# Patient Record
Sex: FEMALE | Race: WHITE | Employment: PART TIME | URBAN - METROPOLITAN AREA
[De-identification: names, ages, dates, MRNs, and addresses within clinical notes are randomized per-mention and may not be internally consistent; named-entity substitution may affect disease eponyms.]

---

## 2017-01-10 ENCOUNTER — HOSPITAL ENCOUNTER (OUTPATIENT)
Dept: MAMMOGRAPHY | Age: 47
Discharge: HOME | End: 2017-01-10
Attending: FAMILY MEDICINE
Payer: COMMERCIAL

## 2017-01-10 DIAGNOSIS — Z12.31 ENCOUNTER FOR SCREENING MAMMOGRAM FOR BREAST CANCER: ICD-10-CM

## 2017-01-10 PROCEDURE — 77063 BREAST TOMOSYNTHESIS BI: CPT

## 2017-01-10 PROCEDURE — 77067 SCR MAMMO BI INCL CAD: CPT

## 2017-03-06 RX ORDER — ALCLOMETASONE DIPROPIONATE 0.5 MG/G
CREAM TOPICAL
Qty: 45 G | Refills: 1 | Status: CANCELLED | OUTPATIENT
Start: 2017-03-06

## 2017-03-06 NOTE — TELEPHONE ENCOUNTER
LOV 11/7/16. Future Appointments  Date Time Provider Naseem Vale   4/5/2017 10:30 AM Darinel López MD [facility] OB/GYNE [facility]     Refill requests for Zolpidem and Alprazolam- meds pended.  Routed to [facility] [place] [facility]

## 2017-03-06 NOTE — PROGRESS NOTES
Faxed request from [facility] for refill on Escitalopram. LOV 11/7/16.   Will you authorize refill- med pending

## 2017-03-08 RX ORDER — ESCITALOPRAM OXALATE 10 MG/1
TABLET ORAL
Qty: 90 TABLET | Refills: 0 | Status: SHIPPED | COMMUNITY
Start: 2017-03-08 | End: 2017-03-08

## 2017-03-08 RX ORDER — ALPRAZOLAM 0.5 MG/1
0.5 TABLET ORAL NIGHTLY PRN
Qty: 30 TABLET | Refills: 2 | OUTPATIENT
Start: 2017-03-08 | End: 2017-07-18

## 2017-03-08 RX ORDER — ESCITALOPRAM OXALATE 10 MG/1
TABLET ORAL
Qty: 90 TABLET | Refills: 0 | Status: SHIPPED | OUTPATIENT
Start: 2017-03-08 | End: 2017-09-12

## 2017-03-08 RX ORDER — ZOLPIDEM TARTRATE 10 MG/1
10 TABLET ORAL NIGHTLY
Qty: 30 TABLET | Refills: 2 | OUTPATIENT
Start: 2017-03-08 | End: 2017-11-13 | Stop reason: DRUGHIGH

## 2017-03-08 NOTE — TELEPHONE ENCOUNTER
Called to [facility] pharmacy per Jamar Lal PA-C order Alprazolam 0.5mg #30 with 2 additional refills and Zolpidem 10mg #30 with 2 additional refills. Spoke with Sangeeta Balbuena telling her she is due for an appointment in 3 months. She is currently driving.  When sh

## 2017-03-08 NOTE — PROGRESS NOTES
HPI:    Patient ID: Isabella Brandon is a 49year old female.     HPI    Review of Systems         Current Outpatient Prescriptions:  Alclometasone Dipropionate 0.05 % External Cream Apply to eyelid and around the nose once daily Disp: 45 g Rfl: 1   [facility] & Referrals:  None       #[id number]

## 2017-03-08 NOTE — PROGRESS NOTES
Escitalopram 10mg #90 refilled per Karina Lal's order to [facility]. Patient is aware she is due for a f/u visit in 3 months.       For your review  Routed to Karina Lal PA-C

## 2017-03-09 ENCOUNTER — TELEPHONE (OUTPATIENT)
Dept: FAMILY MEDICINE CLINIC | Facility: CLINIC | Age: 47
End: 2017-03-09

## 2017-03-09 NOTE — TELEPHONE ENCOUNTER
Spoke with pharmacy- they states they received out refill request but Pt not due for refill till 3/23/17, script last filled 2/23/17 #30.   Pt called back and realized that she has enough meds till next refill

## 2017-03-09 NOTE — TELEPHONE ENCOUNTER
Patient would like to speak to nurse regarding Zolpidem Tartrate 10 MG Oral Tab patient states she did not receive medication from pharmacy. Patient only received alprazolam medication.

## 2017-03-09 NOTE — TELEPHONE ENCOUNTER
Patient called back, patient received both medications from [facility].

## 2017-07-09 ENCOUNTER — LAB SERVICES (OUTPATIENT)
Dept: OTHER | Age: 47
End: 2017-07-09

## 2017-07-09 ENCOUNTER — CHARTING TRANS (OUTPATIENT)
Dept: OTHER | Age: 47
End: 2017-07-09

## 2017-07-09 LAB — RAPID STREP GROUP A: POSITIVE

## 2017-09-12 ENCOUNTER — HOSPITAL ENCOUNTER (EMERGENCY)
Age: 47
Discharge: HOME | End: 2017-09-12
Attending: EMERGENCY MEDICINE
Payer: COMMERCIAL

## 2017-09-12 VITALS
HEIGHT: 69 IN | HEART RATE: 88 BPM | BODY MASS INDEX: 24.14 KG/M2 | TEMPERATURE: 99 F | SYSTOLIC BLOOD PRESSURE: 140 MMHG | DIASTOLIC BLOOD PRESSURE: 86 MMHG | OXYGEN SATURATION: 100 % | RESPIRATION RATE: 16 BRPM | WEIGHT: 163 LBS

## 2017-09-12 DIAGNOSIS — N30.01 ACUTE CYSTITIS WITH HEMATURIA: Primary | ICD-10-CM

## 2017-09-12 LAB
POCT LOT NUMBER: NORMAL
POCT URINE PREGNANCY: NEGATIVE
RBC #/AREA URNS AUTO: >10 /HPF
RBC #/AREA URNS AUTO: >10 /HPF
WBC #/AREA URNS AUTO: >50 /HPF
WBC #/AREA URNS AUTO: >50 /HPF
WBC CLUMPS UR QL AUTO: PRESENT
WBC CLUMPS UR QL AUTO: PRESENT

## 2017-09-12 PROCEDURE — 87186 SC STD MICRODIL/AGAR DIL: CPT | Performed by: EMERGENCY MEDICINE

## 2017-09-12 PROCEDURE — 99284 EMERGENCY DEPT VISIT MOD MDM: CPT

## 2017-09-12 PROCEDURE — 87086 URINE CULTURE/COLONY COUNT: CPT | Performed by: EMERGENCY MEDICINE

## 2017-09-12 PROCEDURE — 81025 URINE PREGNANCY TEST: CPT

## 2017-09-12 PROCEDURE — 87491 CHLMYD TRACH DNA AMP PROBE: CPT | Performed by: EMERGENCY MEDICINE

## 2017-09-12 PROCEDURE — 87591 N.GONORRHOEAE DNA AMP PROB: CPT | Performed by: EMERGENCY MEDICINE

## 2017-09-12 PROCEDURE — 87088 URINE BACTERIA CULTURE: CPT | Performed by: EMERGENCY MEDICINE

## 2017-09-12 PROCEDURE — 87660 TRICHOMONAS VAGIN DIR PROBE: CPT | Performed by: EMERGENCY MEDICINE

## 2017-09-12 PROCEDURE — 81015 MICROSCOPIC EXAM OF URINE: CPT | Performed by: EMERGENCY MEDICINE

## 2017-09-12 PROCEDURE — 87510 GARDNER VAG DNA DIR PROBE: CPT | Performed by: EMERGENCY MEDICINE

## 2017-09-12 PROCEDURE — 87480 CANDIDA DNA DIR PROBE: CPT | Performed by: EMERGENCY MEDICINE

## 2017-09-12 PROCEDURE — 81001 URINALYSIS AUTO W/SCOPE: CPT | Performed by: EMERGENCY MEDICINE

## 2017-09-12 RX ORDER — PHENAZOPYRIDINE HYDROCHLORIDE 200 MG/1
200 TABLET, FILM COATED ORAL 3 TIMES DAILY PRN
Qty: 6 TABLET | Refills: 0 | Status: SHIPPED | OUTPATIENT
Start: 2017-09-12 | End: 2017-09-19

## 2017-09-12 RX ORDER — SULFAMETHOXAZOLE AND TRIMETHOPRIM 800; 160 MG/1; MG/1
1 TABLET ORAL 2 TIMES DAILY
Qty: 14 TABLET | Refills: 0 | Status: SHIPPED | OUTPATIENT
Start: 2017-09-12 | End: 2017-09-19

## 2017-09-12 RX ORDER — PHENAZOPYRIDINE HYDROCHLORIDE 200 MG/1
200 TABLET, FILM COATED ORAL ONCE
Status: COMPLETED | OUTPATIENT
Start: 2017-09-12 | End: 2017-09-12

## 2017-09-12 NOTE — ED PROVIDER NOTES
Patient Seen in: [facility] In [place]    History   Patient presents with:  Urinary Symptoms (urologic)    Stated Complaint: urinary symptoms x few days    HPI    Patient presents with a possible urinary tract infection.   The patient distress. HEENT: Normocephalic, atraumatic, pupils equal round and reactive to light, oropharynx clear, uvula midline. Cardiovascular: Regular rate and rhythm, no murmurs. Respiratory: Lungs clear to auscultation.   Abdomen: Soft, minimal suprapubic tend diagnosis)    Disposition:  Discharge    Follow-up:  Emery Martins MD  [address] [address] [place] [address] [phone]    Schedule an appointment as soon as possible for a visit in 1 week        Medications Prescribed:  Current Discharge Medi

## 2017-09-13 RX ORDER — METRONIDAZOLE 500 MG/1
500 TABLET ORAL 2 TIMES DAILY
Qty: 14 TABLET | Refills: 0 | Status: SHIPPED | OUTPATIENT
Start: 2017-09-13 | End: 2017-09-20

## 2017-09-13 NOTE — ED NOTES
Pt informed of vaginitis panel results, ptverbalizes understanding , flagyl called to [facility] in [address]

## 2017-09-14 LAB
C TRACH DNA SPEC QL NAA+PROBE: NEGATIVE
N GONORRHOEA DNA SPEC QL NAA+PROBE: NEGATIVE

## 2017-09-25 ENCOUNTER — LAB ENCOUNTER (OUTPATIENT)
Dept: LAB | Age: 47
End: 2017-09-25
Attending: OBSTETRICS & GYNECOLOGY
Payer: COMMERCIAL

## 2017-09-25 DIAGNOSIS — N30.00 ACUTE CYSTITIS WITHOUT HEMATURIA: ICD-10-CM

## 2017-09-25 PROCEDURE — 87077 CULTURE AEROBIC IDENTIFY: CPT

## 2017-09-25 PROCEDURE — 87088 URINE BACTERIA CULTURE: CPT

## 2017-09-25 PROCEDURE — 87086 URINE CULTURE/COLONY COUNT: CPT

## 2017-09-25 PROCEDURE — 87186 SC STD MICRODIL/AGAR DIL: CPT

## 2017-10-16 ENCOUNTER — LAB ENCOUNTER (OUTPATIENT)
Dept: LAB | Age: 47
End: 2017-10-16
Attending: PLASTIC SURGERY
Payer: COMMERCIAL

## 2017-10-16 DIAGNOSIS — Z87.440 HISTORY OF URINARY TRACT INFECTION: ICD-10-CM

## 2017-10-16 PROCEDURE — 87086 URINE CULTURE/COLONY COUNT: CPT

## 2017-10-28 ENCOUNTER — OFFICE VISIT (OUTPATIENT)
Dept: FAMILY MEDICINE CLINIC | Facility: CLINIC | Age: 47
End: 2017-10-28

## 2017-10-28 VITALS
HEIGHT: 69 IN | SYSTOLIC BLOOD PRESSURE: 144 MMHG | TEMPERATURE: 99 F | BODY MASS INDEX: 23.99 KG/M2 | DIASTOLIC BLOOD PRESSURE: 70 MMHG | OXYGEN SATURATION: 97 % | HEART RATE: 87 BPM | WEIGHT: 162 LBS

## 2017-10-28 DIAGNOSIS — R30.0 DYSURIA: Primary | ICD-10-CM

## 2017-10-28 PROCEDURE — 81003 URINALYSIS AUTO W/O SCOPE: CPT | Performed by: NURSE PRACTITIONER

## 2017-10-28 PROCEDURE — 99213 OFFICE O/P EST LOW 20 MIN: CPT | Performed by: NURSE PRACTITIONER

## 2017-10-28 PROCEDURE — 87086 URINE CULTURE/COLONY COUNT: CPT | Performed by: NURSE PRACTITIONER

## 2017-10-28 RX ORDER — CIPROFLOXACIN 500 MG/1
500 TABLET, FILM COATED ORAL 2 TIMES DAILY
Qty: 14 TABLET | Refills: 0 | Status: SHIPPED | OUTPATIENT
Start: 2017-10-28 | End: 2017-11-04

## 2017-11-13 ENCOUNTER — OFFICE VISIT (OUTPATIENT)
Dept: FAMILY MEDICINE CLINIC | Facility: CLINIC | Age: 47
End: 2017-11-13

## 2017-11-13 VITALS
WEIGHT: 156 LBS | DIASTOLIC BLOOD PRESSURE: 80 MMHG | BODY MASS INDEX: 23.11 KG/M2 | HEART RATE: 80 BPM | SYSTOLIC BLOOD PRESSURE: 136 MMHG | HEIGHT: 69 IN | RESPIRATION RATE: 12 BRPM

## 2017-11-13 DIAGNOSIS — Z13.1 SCREENING FOR DIABETES MELLITUS: ICD-10-CM

## 2017-11-13 DIAGNOSIS — Z00.00 BLOOD TESTS FOR ROUTINE GENERAL PHYSICAL EXAMINATION: ICD-10-CM

## 2017-11-13 DIAGNOSIS — Z13.220 SCREENING, LIPID: ICD-10-CM

## 2017-11-13 DIAGNOSIS — Z12.31 ENCOUNTER FOR SCREENING MAMMOGRAM FOR BREAST CANCER: ICD-10-CM

## 2017-11-13 DIAGNOSIS — Z13.29 SCREENING FOR THYROID DISORDER: ICD-10-CM

## 2017-11-13 DIAGNOSIS — Z13.0 SCREENING, ANEMIA, DEFICIENCY, IRON: ICD-10-CM

## 2017-11-13 DIAGNOSIS — Z01.419 WELL WOMAN EXAM: Primary | ICD-10-CM

## 2017-11-13 PROCEDURE — 99396 PREV VISIT EST AGE 40-64: CPT | Performed by: FAMILY MEDICINE

## 2017-11-13 RX ORDER — ALPRAZOLAM 0.5 MG/1
0.5 TABLET ORAL NIGHTLY PRN
Qty: 30 TABLET | Refills: 2 | Status: SHIPPED | OUTPATIENT
Start: 2017-11-13 | End: 2018-03-01

## 2017-11-13 RX ORDER — ZOLPIDEM TARTRATE 5 MG/1
5 TABLET ORAL NIGHTLY PRN
Qty: 30 TABLET | Refills: 5 | Status: SHIPPED | OUTPATIENT
Start: 2017-11-13 | End: 2018-05-05

## 2017-11-13 NOTE — PROGRESS NOTES
:HPI:   Guss Kawasaki is a 49year old female who presents for a complete physical exam.     Patient went off Lexapro 3-4 months ago. She is feeling good. She felt that the medication numbed her. She feels that she is laughing more.     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 : . 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 Exercise: three times per week. Walking, light jogging, weights. Diet: watches calories closely. Eating a 1,000 mg calorie diet.      REVIEW OF SYSTEMS:   GENERAL: feels wel Date/Time   WBC 6.7 12/07/2016 09:15 AM   HGB 14.5 12/07/2016 09:15 AM   HCT 41.8 12/07/2016 09:15 AM   .0 12/07/2016 09:15 AM         CMP  (most recent labs)     Lab Results  Component Value Date/Time   GLU 83 12/07/2016 09:15 AM   BUN 11 12/07/201 Tartrate 5 MG Oral Tab; Take 1 tablet (5 mg total) by mouth nightly as needed for Sleep. Recheck Ambien in 6 months. Labs ordered. Mammogram ordered. Self breast exam explained. Discussed exercise and diet.  The patient indicates understanding o

## 2017-12-01 ENCOUNTER — LAB ENCOUNTER (OUTPATIENT)
Dept: LAB | Age: 47
End: 2017-12-01
Attending: FAMILY MEDICINE
Payer: COMMERCIAL

## 2017-12-01 DIAGNOSIS — Z13.1 SCREENING FOR DIABETES MELLITUS: ICD-10-CM

## 2017-12-01 DIAGNOSIS — Z13.0 SCREENING, ANEMIA, DEFICIENCY, IRON: ICD-10-CM

## 2017-12-01 DIAGNOSIS — Z13.29 SCREENING FOR THYROID DISORDER: ICD-10-CM

## 2017-12-01 DIAGNOSIS — Z13.220 SCREENING, LIPID: ICD-10-CM

## 2017-12-01 DIAGNOSIS — Z00.00 BLOOD TESTS FOR ROUTINE GENERAL PHYSICAL EXAMINATION: ICD-10-CM

## 2017-12-01 PROCEDURE — 85025 COMPLETE CBC W/AUTO DIFF WBC: CPT

## 2017-12-01 PROCEDURE — 80061 LIPID PANEL: CPT

## 2017-12-01 PROCEDURE — 36415 COLL VENOUS BLD VENIPUNCTURE: CPT

## 2017-12-01 PROCEDURE — 80053 COMPREHEN METABOLIC PANEL: CPT

## 2017-12-01 PROCEDURE — 84443 ASSAY THYROID STIM HORMONE: CPT

## 2018-02-06 ENCOUNTER — HOSPITAL ENCOUNTER (OUTPATIENT)
Dept: MAMMOGRAPHY | Age: 48
Discharge: HOME | End: 2018-02-06
Attending: FAMILY MEDICINE
Payer: COMMERCIAL

## 2018-02-06 DIAGNOSIS — Z12.31 ENCOUNTER FOR SCREENING MAMMOGRAM FOR BREAST CANCER: ICD-10-CM

## 2018-02-06 PROCEDURE — 77067 SCR MAMMO BI INCL CAD: CPT | Performed by: FAMILY MEDICINE

## 2018-02-06 PROCEDURE — 77063 BREAST TOMOSYNTHESIS BI: CPT | Performed by: FAMILY MEDICINE

## 2018-02-15 ENCOUNTER — HOSPITAL ENCOUNTER (OUTPATIENT)
Dept: MAMMOGRAPHY | Facility: HOSPITAL | Age: 48
Discharge: HOME | End: 2018-02-15
Attending: FAMILY MEDICINE
Payer: COMMERCIAL

## 2018-02-15 DIAGNOSIS — R92.2 INCONCLUSIVE MAMMOGRAM: ICD-10-CM

## 2018-02-15 PROCEDURE — 77065 DX MAMMO INCL CAD UNI: CPT | Performed by: FAMILY MEDICINE

## 2018-03-02 RX ORDER — ALPRAZOLAM 0.5 MG/1
TABLET ORAL
Qty: 30 TABLET | Refills: 2 | Status: SHIPPED | OUTPATIENT
Start: 2018-03-02 | End: 2018-05-16

## 2018-04-12 ENCOUNTER — TELEPHONE (OUTPATIENT)
Dept: FAMILY MEDICINE CLINIC | Facility: CLINIC | Age: 48
End: 2018-04-12

## 2018-04-12 NOTE — TELEPHONE ENCOUNTER
Pt would like to have her B/C through [facility], she has her taking them differently then usual and it would be easier if she was the one to prescribe them.

## 2018-04-12 NOTE — TELEPHONE ENCOUNTER
LOV 11/13/17 for physical  Pt used to get BCP from gyne. Pt wants to know if PRINCESS Lal will fill script.  Pt states she takes 2 months continuously then has a cycle   Please advise- order pended

## 2018-04-13 RX ORDER — NORETHINDRONE ACETATE AND ETHINYL ESTRADIOL 1MG-20(21)
1 KIT ORAL DAILY
Qty: 3 PACKAGE | Refills: 3 | Status: SHIPPED | OUTPATIENT
Start: 2018-04-13 | End: 2018-09-17

## 2018-05-07 RX ORDER — ZOLPIDEM TARTRATE 5 MG/1
TABLET ORAL
Qty: 30 TABLET | Refills: 0 | OUTPATIENT
Start: 2018-05-07 | End: 2018-05-16

## 2018-05-07 NOTE — TELEPHONE ENCOUNTER
Amarilis pt has made appt for next Monday to see you for Ambien refill. She is completely out. Per your request she has now made appt. Please let clinical staff know when patient can  script. Thank you.

## 2018-05-07 NOTE — TELEPHONE ENCOUNTER
Please call Pt and assist with scheduling a appt per Madelyn Lal's instruction. LOV 11/13/17. Once appt is made, please forward message to Madelyn Lal to sign script. Routed to [facility].

## 2018-05-07 NOTE — TELEPHONE ENCOUNTER
Refill request sent from pharmacy for Ambien. LOV 11/13/17. Will you approve refill ? Routed to [employer]

## 2018-05-16 ENCOUNTER — OFFICE VISIT (OUTPATIENT)
Dept: FAMILY MEDICINE CLINIC | Facility: CLINIC | Age: 48
End: 2018-05-16

## 2018-05-16 VITALS
HEIGHT: 69 IN | DIASTOLIC BLOOD PRESSURE: 88 MMHG | BODY MASS INDEX: 22.66 KG/M2 | TEMPERATURE: 98 F | SYSTOLIC BLOOD PRESSURE: 128 MMHG | WEIGHT: 153 LBS | HEART RATE: 60 BPM | RESPIRATION RATE: 16 BRPM

## 2018-05-16 DIAGNOSIS — G47.09 OTHER INSOMNIA: ICD-10-CM

## 2018-05-16 DIAGNOSIS — N94.3 PREMENSTRUAL TENSION: ICD-10-CM

## 2018-05-16 DIAGNOSIS — R92.8 FOLLOW-UP EXAMINATION OF ABNORMAL MAMMOGRAM: Primary | ICD-10-CM

## 2018-05-16 PROCEDURE — 99214 OFFICE O/P EST MOD 30 MIN: CPT | Performed by: FAMILY MEDICINE

## 2018-05-16 RX ORDER — ALPRAZOLAM 0.5 MG/1
TABLET ORAL
Qty: 30 TABLET | Refills: 5 | Status: SHIPPED | OUTPATIENT
Start: 2018-05-16 | End: 2018-09-17

## 2018-05-16 RX ORDER — ZOLPIDEM TARTRATE 5 MG/1
TABLET ORAL
Qty: 30 TABLET | Refills: 5 | Status: SHIPPED | OUTPATIENT
Start: 2018-05-16 | End: 2018-09-17

## 2018-05-16 NOTE — PROGRESS NOTES
Brandi Bhatia is a 51year old female. S:  Patient presents today with the following concerns:  · Follow up Ambien and alprazolam.  She uses each separately to help with sleep. 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   She feels she headaches    EXAM:  /88   Pulse 60   Temp 98.3 °F (36.8 °C) (Oral)   Resp 16   Ht 69\"   Wt 153 lb   BMI 22.59 kg/m²   GENERAL: well developed, well nourished,in no apparent distress.   Mood, affect, and behavior are normal.  SKIN: no rashes,no suspic

## 2018-08-15 ENCOUNTER — TELEPHONE (OUTPATIENT)
Dept: FAMILY MEDICINE CLINIC | Facility: CLINIC | Age: 48
End: 2018-08-15

## 2018-08-15 ENCOUNTER — HOSPITAL ENCOUNTER (OUTPATIENT)
Dept: MAMMOGRAPHY | Facility: HOSPITAL | Age: 48
Discharge: HOME | End: 2018-08-15
Attending: FAMILY MEDICINE
Payer: COMMERCIAL

## 2018-08-15 DIAGNOSIS — R92.8 FOLLOW-UP EXAMINATION OF ABNORMAL MAMMOGRAM: ICD-10-CM

## 2018-08-15 DIAGNOSIS — R92.8 ABNORMALITY OF RIGHT BREAST ON SCREENING MAMMOGRAM: Primary | ICD-10-CM

## 2018-08-15 PROCEDURE — 77065 DX MAMMO INCL CAD UNI: CPT | Performed by: FAMILY MEDICINE

## 2018-08-15 PROCEDURE — 77061 BREAST TOMOSYNTHESIS UNI: CPT | Performed by: FAMILY MEDICINE

## 2018-08-15 NOTE — IMAGING NOTE
Called pt regarding recommendation by Dr. Gabriela Wagner for right breast stereotactic biopsy for calcifications. Pt states she will call back at better time to thoroughly go over instructions. Written information provided to Jj Kim via [address]  Pt verb

## 2018-08-15 NOTE — TELEPHONE ENCOUNTER
Ria Landa from [facility] requesting to speak to a nurse regarding Abnormal Mammogram results

## 2018-08-15 NOTE — TELEPHONE ENCOUNTER
Talking to Dr Yared Headley patient needs stereotatic biopsy of single area in right breast order placed as requested

## 2018-08-20 ENCOUNTER — TELEPHONE (OUTPATIENT)
Dept: MAMMOGRAPHY | Facility: HOSPITAL | Age: 48
End: 2018-08-20

## 2018-08-20 NOTE — TELEPHONE ENCOUNTER
Pt returned phone call back for recommendation for a right breast biopsy for calcifications. Emotional and educational support provided. Written information provided to Tiara Peers.  Our breast center schedulers will call pt within 72 hours to schedule

## 2018-09-10 ENCOUNTER — HOSPITAL ENCOUNTER (OUTPATIENT)
Dept: MAMMOGRAPHY | Facility: HOSPITAL | Age: 48
Discharge: HOME | End: 2018-09-10
Attending: FAMILY MEDICINE
Payer: COMMERCIAL

## 2018-09-10 DIAGNOSIS — R92.8 ABNORMALITY OF RIGHT BREAST ON SCREENING MAMMOGRAM: ICD-10-CM

## 2018-09-10 PROCEDURE — 88305 TISSUE EXAM BY PATHOLOGIST: CPT | Performed by: FAMILY MEDICINE

## 2018-09-10 PROCEDURE — 19081 BX BREAST 1ST LESION STRTCTC: CPT | Performed by: FAMILY MEDICINE

## 2018-09-12 ENCOUNTER — TELEPHONE (OUTPATIENT)
Dept: FAMILY MEDICINE CLINIC | Facility: CLINIC | Age: 48
End: 2018-09-12

## 2018-09-12 ENCOUNTER — TELEPHONE (OUTPATIENT)
Dept: MAMMOGRAPHY | Facility: HOSPITAL | Age: 48
End: 2018-09-12

## 2018-09-12 NOTE — TELEPHONE ENCOUNTER
[facility] a breast biopsy result and she would like to speak to a nurse.

## 2018-09-12 NOTE — TELEPHONE ENCOUNTER
LM with Nurse in office regarding patient's new breast diagnosis of FEA. Surgical consult is recommended. all MD's and Tiffany Young PA-C are out of the office until tomorrow. Awaiting call back with name of surgeon to refer pt to.

## 2018-09-12 NOTE — TELEPHONE ENCOUNTER
Spoke with Elizabeth, Breast care nurse and she states that pt's biopsy reveals atypical cells right breast,FEA splat epithelial atypica. This requires surgery. She is requesting the name of a surgeon. Pleae advise so that we can call her back.      Flor

## 2018-09-13 ENCOUNTER — TELEPHONE (OUTPATIENT)
Dept: MAMMOGRAPHY | Facility: HOSPITAL | Age: 48
End: 2018-09-13

## 2018-09-13 NOTE — TELEPHONE ENCOUNTER
Spoke with pt about new Right breast diagnosis of FEA. Pt thankful for information and is anxious to get consult and surgery. Said she would like to call surgeon to arrange consult. Information about Dr. Marti Ingram given to her. Pt verbalized understanding.

## 2018-09-17 ENCOUNTER — HOSPITAL ENCOUNTER (OUTPATIENT)
Dept: GENERAL RADIOLOGY | Age: 48
Discharge: HOME | End: 2018-09-17
Attending: FAMILY MEDICINE
Payer: COMMERCIAL

## 2018-09-17 ENCOUNTER — OFFICE VISIT (OUTPATIENT)
Dept: FAMILY MEDICINE CLINIC | Facility: CLINIC | Age: 48
End: 2018-09-17
Payer: COMMERCIAL

## 2018-09-17 VITALS
RESPIRATION RATE: 16 BRPM | WEIGHT: 154.19 LBS | BODY MASS INDEX: 22.58 KG/M2 | DIASTOLIC BLOOD PRESSURE: 86 MMHG | HEART RATE: 76 BPM | SYSTOLIC BLOOD PRESSURE: 128 MMHG | HEIGHT: 69.25 IN | TEMPERATURE: 99 F

## 2018-09-17 DIAGNOSIS — Z13.29 SCREENING FOR THYROID DISORDER: ICD-10-CM

## 2018-09-17 DIAGNOSIS — Z00.00 LABORATORY EXAMINATION ORDERED AS PART OF A ROUTINE GENERAL MEDICAL EXAMINATION: ICD-10-CM

## 2018-09-17 DIAGNOSIS — R76.8 ANA POSITIVE: ICD-10-CM

## 2018-09-17 DIAGNOSIS — N39.0 URINARY TRACT INFECTION WITHOUT HEMATURIA, SITE UNSPECIFIED: Primary | ICD-10-CM

## 2018-09-17 DIAGNOSIS — Z13.0 SCREENING FOR IRON DEFICIENCY ANEMIA: ICD-10-CM

## 2018-09-17 DIAGNOSIS — M89.9 LUMP OF RIB: ICD-10-CM

## 2018-09-17 LAB
MULTISTIX LOT#: ABNORMAL NUMERIC
NITRITE, URINE: POSITIVE
PH, URINE: 7 (ref 4.5–8)
SPECIFIC GRAVITY: 1.02 (ref 1–1.03)

## 2018-09-17 PROCEDURE — 81003 URINALYSIS AUTO W/O SCOPE: CPT | Performed by: FAMILY MEDICINE

## 2018-09-17 PROCEDURE — 87186 SC STD MICRODIL/AGAR DIL: CPT | Performed by: FAMILY MEDICINE

## 2018-09-17 PROCEDURE — 99214 OFFICE O/P EST MOD 30 MIN: CPT | Performed by: FAMILY MEDICINE

## 2018-09-17 PROCEDURE — 87086 URINE CULTURE/COLONY COUNT: CPT | Performed by: FAMILY MEDICINE

## 2018-09-17 PROCEDURE — 87088 URINE BACTERIA CULTURE: CPT | Performed by: FAMILY MEDICINE

## 2018-09-17 PROCEDURE — 71101 X-RAY EXAM UNILAT RIBS/CHEST: CPT | Performed by: FAMILY MEDICINE

## 2018-09-17 RX ORDER — NORETHINDRONE ACETATE AND ETHINYL ESTRADIOL 1MG-20(21)
1 KIT ORAL DAILY
Qty: 3 PACKAGE | Refills: 3 | Status: SHIPPED | OUTPATIENT
Start: 2018-09-17 | End: 2019-09-19

## 2018-09-17 RX ORDER — ZOLPIDEM TARTRATE 5 MG/1
TABLET ORAL
Qty: 30 TABLET | Refills: 5 | Status: SHIPPED | OUTPATIENT
Start: 2018-09-17 | End: 2019-04-13

## 2018-09-17 RX ORDER — ALPRAZOLAM 0.5 MG/1
TABLET ORAL
Qty: 30 TABLET | Refills: 5 | Status: SHIPPED | OUTPATIENT
Start: 2018-09-17 | End: 2018-09-18 | Stop reason: ALTCHOICE

## 2018-09-17 RX ORDER — IBUPROFEN 200 MG
1 CAPSULE ORAL DAILY
COMMUNITY

## 2018-09-17 RX ORDER — NITROFURANTOIN 25; 75 MG/1; MG/1
100 CAPSULE ORAL 2 TIMES DAILY
Qty: 14 CAPSULE | Refills: 0 | Status: SHIPPED | OUTPATIENT
Start: 2018-09-17 | End: 2018-10-09

## 2018-09-17 RX ORDER — THIAMINE HCL 100 MG
TABLET ORAL NIGHTLY
COMMUNITY

## 2018-09-17 NOTE — PROGRESS NOTES
Tiara Holloway is a 51year old female. S:  Patient presents today with the following concerns:  · Had recent breast biopsy with FEA results. Dr. Mervat Brink tomorrow. · UTI symptoms for 1 month. Cloudy urine. No dysuria. Foul odor to urine.     · Lab exertion  GI: denies abdominal pain.   No N/V/D/C  : denies dysuria  MUSCULOSKELETAL: denies back pain  NEURO: denies headaches    EXAM:  /86   Pulse 76   Temp 99.1 °F (37.3 °C) (Oral)   Resp 16   Ht 69.25\"   Wt 154 lb 3.2 oz   BMI 22.61 kg/m²   GE by mouth 2 (two) times daily. Imaging & Consults:  XR RIBS, UNILATERAL (2 VIEWS), LEFT (CPT=71100)    Meds and orders as above. Possibly fractured rib with moving daughter in. Labs ordered. Urine culture sent.   Encouraged regular physical Carl Guzman

## 2018-09-18 ENCOUNTER — LAB ENCOUNTER (OUTPATIENT)
Dept: LAB | Age: 48
End: 2018-09-18
Attending: FAMILY MEDICINE
Payer: COMMERCIAL

## 2018-09-18 ENCOUNTER — OFFICE VISIT (OUTPATIENT)
Dept: SURGERY | Facility: CLINIC | Age: 48
End: 2018-09-18
Payer: COMMERCIAL

## 2018-09-18 VITALS
RESPIRATION RATE: 20 BRPM | BODY MASS INDEX: 22.58 KG/M2 | HEART RATE: 73 BPM | WEIGHT: 154.19 LBS | HEIGHT: 69.25 IN | SYSTOLIC BLOOD PRESSURE: 148 MMHG | DIASTOLIC BLOOD PRESSURE: 89 MMHG | OXYGEN SATURATION: 100 %

## 2018-09-18 DIAGNOSIS — R76.8 ANA POSITIVE: ICD-10-CM

## 2018-09-18 DIAGNOSIS — Z13.0 SCREENING FOR IRON DEFICIENCY ANEMIA: ICD-10-CM

## 2018-09-18 DIAGNOSIS — Z00.00 LABORATORY EXAMINATION ORDERED AS PART OF A ROUTINE GENERAL MEDICAL EXAMINATION: ICD-10-CM

## 2018-09-18 DIAGNOSIS — Z13.29 SCREENING FOR THYROID DISORDER: ICD-10-CM

## 2018-09-18 DIAGNOSIS — N60.81 FLAT EPITHELIAL ATYPIA (FEA) OF RIGHT BREAST: Primary | ICD-10-CM

## 2018-09-18 LAB
ALBUMIN SERPL-MCNC: 3.6 G/DL (ref 3.5–4.8)
ALBUMIN/GLOB SERPL: 1 {RATIO} (ref 1–2)
ALP LIVER SERPL-CCNC: 48 U/L (ref 39–100)
ALT SERPL-CCNC: 25 U/L (ref 14–54)
ANION GAP SERPL CALC-SCNC: 12 MMOL/L (ref 0–18)
AST SERPL-CCNC: 33 U/L (ref 15–41)
BASOPHILS # BLD AUTO: 0.08 X10(3) UL (ref 0–0.1)
BASOPHILS NFR BLD AUTO: 1.5 %
BILIRUB SERPL-MCNC: 0.4 MG/DL (ref 0.1–2)
BUN BLD-MCNC: 9 MG/DL (ref 8–20)
BUN/CREAT SERPL: 12.3 (ref 10–20)
CALCIUM BLD-MCNC: 8.7 MG/DL (ref 8.3–10.3)
CHLORIDE SERPL-SCNC: 108 MMOL/L (ref 101–111)
CHOLEST SMN-MCNC: 193 MG/DL (ref ?–200)
CO2 SERPL-SCNC: 19 MMOL/L (ref 22–32)
CREAT BLD-MCNC: 0.73 MG/DL (ref 0.55–1.02)
DEPRECATED HBV CORE AB SER IA-ACNC: 113.1 NG/ML (ref 12–240)
EOSINOPHIL # BLD AUTO: 0.25 X10(3) UL (ref 0–0.3)
EOSINOPHIL NFR BLD AUTO: 4.8 %
ERYTHROCYTE [DISTWIDTH] IN BLOOD BY AUTOMATED COUNT: 11.9 % (ref 11.5–16)
GLOBULIN PLAS-MCNC: 3.5 G/DL (ref 2.5–4)
GLUCOSE BLD-MCNC: 67 MG/DL (ref 70–99)
HCT VFR BLD AUTO: 41.1 % (ref 34–50)
HDLC SERPL-MCNC: 108 MG/DL (ref 40–59)
HGB BLD-MCNC: 14 G/DL (ref 12–16)
IMMATURE GRANULOCYTE COUNT: 0.02 X10(3) UL (ref 0–1)
IMMATURE GRANULOCYTE RATIO %: 0.4 %
LDLC SERPL CALC-MCNC: 71 MG/DL (ref ?–100)
LYMPHOCYTES # BLD AUTO: 1.59 X10(3) UL (ref 0.9–4)
LYMPHOCYTES NFR BLD AUTO: 30.3 %
M PROTEIN MFR SERPL ELPH: 7.1 G/DL (ref 6.1–8.3)
MCH RBC QN AUTO: 33.4 PG (ref 27–33.2)
MCHC RBC AUTO-ENTMCNC: 34.1 G/DL (ref 31–37)
MCV RBC AUTO: 98.1 FL (ref 81–100)
MONOCYTES # BLD AUTO: 0.4 X10(3) UL (ref 0.1–1)
MONOCYTES NFR BLD AUTO: 7.6 %
NEUTROPHIL ABS PRELIM: 2.91 X10 (3) UL (ref 1.3–6.7)
NEUTROPHILS # BLD AUTO: 2.91 X10(3) UL (ref 1.3–6.7)
NEUTROPHILS NFR BLD AUTO: 55.4 %
NONHDLC SERPL-MCNC: 85 MG/DL (ref ?–130)
OSMOLALITY SERPL CALC.SUM OF ELEC: 285 MOSM/KG (ref 275–295)
PLATELET # BLD AUTO: 313 10(3)UL (ref 150–450)
POTASSIUM SERPL-SCNC: 4.2 MMOL/L (ref 3.6–5.1)
RBC # BLD AUTO: 4.19 X10(6)UL (ref 3.8–5.1)
RED CELL DISTRIBUTION WIDTH-SD: 42.8 FL (ref 35.1–46.3)
SODIUM SERPL-SCNC: 139 MMOL/L (ref 136–144)
TRIGL SERPL-MCNC: 71 MG/DL (ref 30–149)
TSI SER-ACNC: 2.26 MIU/ML (ref 0.35–5.5)
VIT D+METAB SERPL-MCNC: 51.8 NG/ML (ref 30–100)
VLDLC SERPL CALC-MCNC: 14 MG/DL (ref 0–30)
WBC # BLD AUTO: 5.3 X10(3) UL (ref 4–13)

## 2018-09-18 PROCEDURE — 99245 OFF/OP CONSLTJ NEW/EST HI 55: CPT | Performed by: SURGERY

## 2018-09-18 PROCEDURE — 80053 COMPREHEN METABOLIC PANEL: CPT

## 2018-09-18 PROCEDURE — 82306 VITAMIN D 25 HYDROXY: CPT

## 2018-09-18 PROCEDURE — 80061 LIPID PANEL: CPT

## 2018-09-18 PROCEDURE — 84443 ASSAY THYROID STIM HORMONE: CPT

## 2018-09-18 PROCEDURE — 36415 COLL VENOUS BLD VENIPUNCTURE: CPT

## 2018-09-18 PROCEDURE — 86038 ANTINUCLEAR ANTIBODIES: CPT

## 2018-09-18 PROCEDURE — 85025 COMPLETE CBC W/AUTO DIFF WBC: CPT

## 2018-09-18 PROCEDURE — 82728 ASSAY OF FERRITIN: CPT

## 2018-09-18 RX ORDER — MELATONIN 10 MG
1 CAPSULE ORAL
COMMUNITY
End: 2018-10-09

## 2018-09-20 LAB — ANA SCREEN: NEGATIVE

## 2018-09-26 DIAGNOSIS — M89.9 DISORDER OF RIB: Primary | ICD-10-CM

## 2018-10-01 ENCOUNTER — TELEPHONE (OUTPATIENT)
Dept: FAMILY MEDICINE CLINIC | Facility: CLINIC | Age: 48
End: 2018-10-01

## 2018-10-01 DIAGNOSIS — N30.00 ACUTE CYSTITIS WITHOUT HEMATURIA: Primary | ICD-10-CM

## 2018-10-01 NOTE — TELEPHONE ENCOUNTER
Pt would like another urine culture, she spoke to a nurse and Winferd  and if it is not cleared up she would like to have another urine culture. Please add to chart. Thank you.

## 2018-10-03 ENCOUNTER — APPOINTMENT (OUTPATIENT)
Dept: LAB | Facility: HOSPITAL | Age: 48
End: 2018-10-03
Attending: FAMILY MEDICINE
Payer: COMMERCIAL

## 2018-10-03 ENCOUNTER — HOSPITAL ENCOUNTER (OUTPATIENT)
Dept: ULTRASOUND IMAGING | Facility: HOSPITAL | Age: 48
Discharge: HOME | End: 2018-10-03
Attending: FAMILY MEDICINE
Payer: COMMERCIAL

## 2018-10-03 DIAGNOSIS — M89.9 DISORDER OF RIB: ICD-10-CM

## 2018-10-03 DIAGNOSIS — N30.00 ACUTE CYSTITIS WITHOUT HEMATURIA: ICD-10-CM

## 2018-10-03 PROCEDURE — 87186 SC STD MICRODIL/AGAR DIL: CPT

## 2018-10-03 PROCEDURE — 87088 URINE BACTERIA CULTURE: CPT

## 2018-10-03 PROCEDURE — 76604 US EXAM CHEST: CPT | Performed by: FAMILY MEDICINE

## 2018-10-03 PROCEDURE — 87086 URINE CULTURE/COLONY COUNT: CPT

## 2018-10-04 NOTE — PROGRESS NOTES
Breast Surgery New Patient Consultation    This is the first visit for this 51year old woman, referred by Dr. Martínez Funez, who presents for evaluation of right breast FEA.     History of Present Illness:   Ms. Fidel Lechuga is a 51year old woman who presents (100 mg total) by mouth 2 (two) times daily. Disp: 14 capsule Rfl: 0   Vitamin C (VITAMIN C) 500 MG Oral Tab Take 500 mg by mouth daily. Disp:  Rfl:    Multiple Vitamins-Minerals (B COMPLEX VITAMINS PLUS) Oral Tab Take 1 tablet by mouth daily.  Disp:  Rfl: the legs or chest pain while walking.     Breasts:  See history of present illness    Gastrointestinal:     There is no history of difficulty or pain with swallowing, reflux symptoms, vomiting, dark or bloody stools, constipation, yellowing of the skin, ind movements are normal. Her affect is appropriate. HEENT: The head is normocephalic. The neck is supple. The thyroid is not enlarged and is without palpable masses/nodules. There are no palpable masses. The trachea is in the midline.  Conjunctiva are clear significance and implications of FEA found on core biopsy with regard to probability of underdiagnosis of DCIS, and with regard to future breast cancer risk was discussed with the patient.  For the 1st issue, I would recommend wire localization and surgical

## 2018-10-08 DIAGNOSIS — M89.9 RIB LESION: Primary | ICD-10-CM

## 2018-10-08 DIAGNOSIS — N30.00 ACUTE CYSTITIS WITHOUT HEMATURIA: ICD-10-CM

## 2018-10-08 RX ORDER — SULFAMETHOXAZOLE AND TRIMETHOPRIM 800; 160 MG/1; MG/1
1 TABLET ORAL 2 TIMES DAILY
Qty: 14 TABLET | Refills: 0 | Status: SHIPPED | OUTPATIENT
Start: 2018-10-08 | End: 2018-10-09

## 2018-10-12 ENCOUNTER — TELEPHONE (OUTPATIENT)
Dept: MAMMOGRAPHY | Facility: HOSPITAL | Age: 48
End: 2018-10-12

## 2018-10-12 NOTE — TELEPHONE ENCOUNTER
This  Nurse contacted Mrs Malaika Whalen she is aware of needle localization additional questions answered . Mrs Malaika Whalen was told to arrive at 36 this rn instructed for her to arrive at 7am due to loc schedule for  8am  An Mrs Malaika Whalen needs to be prepped by surge

## 2018-10-14 ENCOUNTER — HOSPITAL ENCOUNTER (OUTPATIENT)
Dept: CT IMAGING | Age: 48
Discharge: HOME | End: 2018-10-14
Attending: FAMILY MEDICINE
Payer: COMMERCIAL

## 2018-10-14 ENCOUNTER — HOSPITAL ENCOUNTER (OUTPATIENT)
Dept: CT IMAGING | Age: 48
End: 2018-10-14
Attending: FAMILY MEDICINE
Payer: COMMERCIAL

## 2018-10-14 DIAGNOSIS — M89.9 RIB LESION: ICD-10-CM

## 2018-10-14 PROCEDURE — 71250 CT THORAX DX C-: CPT | Performed by: FAMILY MEDICINE

## 2018-10-15 ENCOUNTER — HOSPITAL ENCOUNTER (OUTPATIENT)
Dept: MAMMOGRAPHY | Facility: HOSPITAL | Age: 48
Discharge: HOME | End: 2018-10-15
Attending: SURGERY
Payer: COMMERCIAL

## 2018-10-15 ENCOUNTER — ANESTHESIA EVENT (OUTPATIENT)
Dept: SURGERY | Facility: HOSPITAL | Age: 48
End: 2018-10-15
Payer: COMMERCIAL

## 2018-10-15 ENCOUNTER — HOSPITAL ENCOUNTER (OUTPATIENT)
Facility: HOSPITAL | Age: 48
Setting detail: HOSPITAL OUTPATIENT SURGERY
Discharge: HOME | End: 2018-10-15
Attending: SURGERY | Admitting: SURGERY
Payer: COMMERCIAL

## 2018-10-15 ENCOUNTER — ANESTHESIA (OUTPATIENT)
Dept: SURGERY | Facility: HOSPITAL | Age: 48
End: 2018-10-15
Payer: COMMERCIAL

## 2018-10-15 ENCOUNTER — APPOINTMENT (OUTPATIENT)
Dept: MAMMOGRAPHY | Facility: HOSPITAL | Age: 48
End: 2018-10-15
Attending: SURGERY
Payer: COMMERCIAL

## 2018-10-15 VITALS
HEIGHT: 69 IN | DIASTOLIC BLOOD PRESSURE: 81 MMHG | TEMPERATURE: 98 F | RESPIRATION RATE: 15 BRPM | SYSTOLIC BLOOD PRESSURE: 123 MMHG | OXYGEN SATURATION: 100 % | BODY MASS INDEX: 22.96 KG/M2 | WEIGHT: 155 LBS | HEART RATE: 84 BPM

## 2018-10-15 VITALS — SYSTOLIC BLOOD PRESSURE: 136 MMHG | DIASTOLIC BLOOD PRESSURE: 84 MMHG | HEART RATE: 79 BPM

## 2018-10-15 DIAGNOSIS — N60.81 FLAT EPITHELIAL ATYPIA (FEA) OF RIGHT BREAST: ICD-10-CM

## 2018-10-15 DIAGNOSIS — N63.10 BREAST MASS, RIGHT: Primary | ICD-10-CM

## 2018-10-15 PROCEDURE — 81025 URINE PREGNANCY TEST: CPT

## 2018-10-15 PROCEDURE — 88307 TISSUE EXAM BY PATHOLOGIST: CPT | Performed by: SURGERY

## 2018-10-15 PROCEDURE — 19281 PERQ DEVICE BREAST 1ST IMAG: CPT | Performed by: SURGERY

## 2018-10-15 PROCEDURE — 0HBT0ZX EXCISION OF RIGHT BREAST, OPEN APPROACH, DIAGNOSTIC: ICD-10-PCS | Performed by: SURGERY

## 2018-10-15 PROCEDURE — 76098 X-RAY EXAM SURGICAL SPECIMEN: CPT | Performed by: SURGERY

## 2018-10-15 RX ORDER — LIDOCAINE HYDROCHLORIDE AND EPINEPHRINE 10; 10 MG/ML; UG/ML
INJECTION, SOLUTION INFILTRATION; PERINEURAL AS NEEDED
Status: DISCONTINUED | OUTPATIENT
Start: 2018-10-15 | End: 2018-10-15 | Stop reason: HOSPADM

## 2018-10-15 RX ORDER — SODIUM CHLORIDE, SODIUM LACTATE, POTASSIUM CHLORIDE, CALCIUM CHLORIDE 600; 310; 30; 20 MG/100ML; MG/100ML; MG/100ML; MG/100ML
INJECTION, SOLUTION INTRAVENOUS CONTINUOUS
Status: DISCONTINUED | OUTPATIENT
Start: 2018-10-15 | End: 2018-10-15

## 2018-10-15 RX ORDER — LIDOCAINE HYDROCHLORIDE 10 MG/ML
1 INJECTION, SOLUTION EPIDURAL; INFILTRATION; INTRACAUDAL; PERINEURAL ONCE
Status: COMPLETED | OUTPATIENT
Start: 2018-10-15 | End: 2018-10-15

## 2018-10-15 RX ORDER — CEFAZOLIN SODIUM/WATER 2 G/20 ML
2 SYRINGE (ML) INTRAVENOUS ONCE
Status: COMPLETED | OUTPATIENT
Start: 2018-10-15 | End: 2018-10-15

## 2018-10-15 RX ORDER — MORPHINE SULFATE 4 MG/ML
4 INJECTION, SOLUTION INTRAMUSCULAR; INTRAVENOUS EVERY 10 MIN PRN
Status: DISCONTINUED | OUTPATIENT
Start: 2018-10-15 | End: 2018-10-15

## 2018-10-15 RX ORDER — HYDROCODONE BITARTRATE AND ACETAMINOPHEN 5; 325 MG/1; MG/1
2 TABLET ORAL AS NEEDED
Status: DISCONTINUED | OUTPATIENT
Start: 2018-10-15 | End: 2018-10-15

## 2018-10-15 RX ORDER — METOCLOPRAMIDE 10 MG/1
10 TABLET ORAL ONCE
Status: DISCONTINUED | OUTPATIENT
Start: 2018-10-15 | End: 2018-10-15 | Stop reason: HOSPADM

## 2018-10-15 RX ORDER — FAMOTIDINE 20 MG/1
20 TABLET ORAL ONCE
Status: DISCONTINUED | OUTPATIENT
Start: 2018-10-15 | End: 2018-10-15 | Stop reason: HOSPADM

## 2018-10-15 RX ORDER — MIDAZOLAM HYDROCHLORIDE 1 MG/ML
INJECTION INTRAMUSCULAR; INTRAVENOUS AS NEEDED
Status: DISCONTINUED | OUTPATIENT
Start: 2018-10-15 | End: 2018-10-15 | Stop reason: SURG

## 2018-10-15 RX ORDER — LIDOCAINE HYDROCHLORIDE 10 MG/ML
INJECTION, SOLUTION EPIDURAL; INFILTRATION; INTRACAUDAL; PERINEURAL AS NEEDED
Status: DISCONTINUED | OUTPATIENT
Start: 2018-10-15 | End: 2018-10-15 | Stop reason: SURG

## 2018-10-15 RX ORDER — HYDROCODONE BITARTRATE AND ACETAMINOPHEN 5; 325 MG/1; MG/1
1 TABLET ORAL AS NEEDED
Status: DISCONTINUED | OUTPATIENT
Start: 2018-10-15 | End: 2018-10-15

## 2018-10-15 RX ORDER — ACETAMINOPHEN 500 MG
1000 TABLET ORAL ONCE
Status: COMPLETED | OUTPATIENT
Start: 2018-10-15 | End: 2018-10-15

## 2018-10-15 RX ORDER — BUPIVACAINE HYDROCHLORIDE 5 MG/ML
INJECTION, SOLUTION EPIDURAL; INTRACAUDAL AS NEEDED
Status: DISCONTINUED | OUTPATIENT
Start: 2018-10-15 | End: 2018-10-15 | Stop reason: HOSPADM

## 2018-10-15 RX ORDER — HALOPERIDOL 5 MG/ML
0.25 INJECTION INTRAMUSCULAR ONCE AS NEEDED
Status: DISCONTINUED | OUTPATIENT
Start: 2018-10-15 | End: 2018-10-15

## 2018-10-15 RX ORDER — NALOXONE HYDROCHLORIDE 0.4 MG/ML
80 INJECTION, SOLUTION INTRAMUSCULAR; INTRAVENOUS; SUBCUTANEOUS AS NEEDED
Status: DISCONTINUED | OUTPATIENT
Start: 2018-10-15 | End: 2018-10-15

## 2018-10-15 RX ORDER — ONDANSETRON 2 MG/ML
4 INJECTION INTRAMUSCULAR; INTRAVENOUS ONCE AS NEEDED
Status: DISCONTINUED | OUTPATIENT
Start: 2018-10-15 | End: 2018-10-15

## 2018-10-15 RX ORDER — MORPHINE SULFATE 4 MG/ML
2 INJECTION, SOLUTION INTRAMUSCULAR; INTRAVENOUS EVERY 10 MIN PRN
Status: DISCONTINUED | OUTPATIENT
Start: 2018-10-15 | End: 2018-10-15

## 2018-10-15 RX ORDER — ONDANSETRON 2 MG/ML
INJECTION INTRAMUSCULAR; INTRAVENOUS AS NEEDED
Status: DISCONTINUED | OUTPATIENT
Start: 2018-10-15 | End: 2018-10-15 | Stop reason: SURG

## 2018-10-15 RX ORDER — MORPHINE SULFATE 10 MG/ML
6 INJECTION, SOLUTION INTRAMUSCULAR; INTRAVENOUS EVERY 10 MIN PRN
Status: DISCONTINUED | OUTPATIENT
Start: 2018-10-15 | End: 2018-10-15

## 2018-10-15 RX ORDER — LIDOCAINE HYDROCHLORIDE 10 MG/ML
INJECTION, SOLUTION EPIDURAL; INFILTRATION; INTRACAUDAL; PERINEURAL
Status: DISCONTINUED
Start: 2018-10-15 | End: 2018-10-15

## 2018-10-15 RX ORDER — HYDROCODONE BITARTRATE AND ACETAMINOPHEN 5; 325 MG/1; MG/1
1-2 TABLET ORAL EVERY 6 HOURS PRN
Qty: 30 TABLET | Refills: 0 | Status: SHIPPED | OUTPATIENT
Start: 2018-10-15 | End: 2018-10-23 | Stop reason: ALTCHOICE

## 2018-10-15 RX ORDER — DEXAMETHASONE SODIUM PHOSPHATE 4 MG/ML
VIAL (ML) INJECTION AS NEEDED
Status: DISCONTINUED | OUTPATIENT
Start: 2018-10-15 | End: 2018-10-15 | Stop reason: SURG

## 2018-10-15 RX ADMIN — CEFAZOLIN SODIUM/WATER 2 G: 2 G/20 ML SYRINGE (ML) INTRAVENOUS at 09:50:00

## 2018-10-15 RX ADMIN — LIDOCAINE HYDROCHLORIDE 50 MG: 10 INJECTION, SOLUTION EPIDURAL; INFILTRATION; INTRACAUDAL; PERINEURAL at 09:44:00

## 2018-10-15 RX ADMIN — DEXAMETHASONE SODIUM PHOSPHATE 4 MG: 4 MG/ML VIAL (ML) INJECTION at 09:50:00

## 2018-10-15 RX ADMIN — LIDOCAINE HYDROCHLORIDE 1 ML: 10 INJECTION, SOLUTION EPIDURAL; INFILTRATION; INTRACAUDAL; PERINEURAL at 08:36:00

## 2018-10-15 RX ADMIN — ONDANSETRON 4 MG: 2 INJECTION INTRAMUSCULAR; INTRAVENOUS at 09:50:00

## 2018-10-15 RX ADMIN — MIDAZOLAM HYDROCHLORIDE 2 MG: 1 INJECTION INTRAMUSCULAR; INTRAVENOUS at 09:44:00

## 2018-10-15 RX ADMIN — SODIUM CHLORIDE, SODIUM LACTATE, POTASSIUM CHLORIDE, CALCIUM CHLORIDE: 600; 310; 30; 20 INJECTION, SOLUTION INTRAVENOUS at 10:29:00

## 2018-10-15 NOTE — IMAGING NOTE
56:  Ms. Chang Cabrera arrived to ultrasound /mammography department      scans completed by mammography technician, Aristeo Veliz taken:  Right Breast atypia    Procedure explained questions answered to Ms. Alford's satisfaction.     Order verified and i

## 2018-10-15 NOTE — ANESTHESIA PROCEDURE NOTES
ANESTHESIA INTUBATION  Date/Time: 10/15/2018 9:44 AM  Urgency: elective    Airway not difficult    General Information and Staff    Patient location during procedure: OR  Anesthesiologist: Celestino Lundborg, MD  Performed: anesthesiologist     Indications

## 2018-10-15 NOTE — PROGRESS NOTES
Discussed CT of the Chest results with Romario Whitten telling her that it was a 10th posterior rib fracture with Callus formation consistent with a healing Fx per [facility].   Told her there is a call in to the radiologist to clarify a comment regarding th

## 2018-10-15 NOTE — OPERATIVE REPORT
[facility]    PATIENT'S NAME: Marla Haddad   ATTENDING PHYSICIAN: Óscar Phelps. Mervat Brink MD   OPERATING PHYSICIAN: Óscar Phelps.  Mervat Brink MD   PATIENT ACCOUNT#:   [id number]    LOCATION:  [facility] [address] PACU [address] [place] [address]  MEDICAL RECORD #:   [id number] compression devices were applied to her legs for DVT prophylaxis. General anesthesia was induced and the right breast was prepped and draped in the usual sterile fashion.   Lidocaine 1% with epinephrine was used to infiltrate the skin and subcutaneous tiss

## 2018-10-15 NOTE — BRIEF OP NOTE
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

## 2018-10-15 NOTE — PROGRESS NOTES
An Addendum was added to the CT of the Chest by Live Alarcon stating: There is a typographical error within the last sentence under the \"CHEST WALL\" heading. The last sentence should read:   \"There are no additional abnormalities along the chest wall identi

## 2018-10-15 NOTE — ANESTHESIA PREPROCEDURE EVALUATION
Anesthesia PreOp Note    HPI:     Isabella Brandon is a 51year old female who presents for preoperative consultation requested by: Aziza Pavon MD    Date of Surgery: 10/15/2018    Procedure(s):  BREAST BIOPSY NEEDLE LOCALIZATION  Indication: Flat ep Tyrone Guo MD Last Rate: 20 mL/hr at 10/15/18 0725   Lidocaine HCl (PF) (XYLOCAINE) 1 % injection SOLN        lactated ringers infusion  Intravenous Continuous Victor M Thomason MD    HYDROcodone-acetaminophen (NORCO) 5-325 MG per tab 1 tablet 1 mg at 10/15/18 0950   dexamethasone Sodium Phosphate (DECADRON) 4 MG/ML injection  Intravenous PRN Jimena Bah MD 4 mg at 10/15/18 0950     Current Outpatient Medications Ordered in Epic:  HYDROcodone-acetaminophen 5-325 MG Oral Tab Take 1-2 tablet Not Asked        Exercise: Yes          3-4 days a week        Bike Helmet: Not Asked        Seat Belt: Yes        Self-Exams: No    Social History Narrative      Not on file      Available pre-op labs reviewed.   Lab Results   Component Value Date    WBC 5 patient desires the anesthetic management as planned.   Ildefonso De La Vega  10/15/2018 10:30 AM

## 2018-10-15 NOTE — H&P
History of Present Illness:   Ms. Bryan Dejesus is a 51year old woman who presents with imaging detected right breast calcifications. The patient denies any palpable masses, nipple discharge, skin changes or axillary symptoms.   She underwent a 3D scree Rfl:    Multiple Vitamins-Minerals (B COMPLEX VITAMINS PLUS) Oral Tab Take 1 tablet by mouth daily. Disp:  Rfl:    Cholecalciferol (VITAMIN D) 1000 UNITS Oral Tab Take 1 tablet by mouth daily.  Disp:  Rfl:          Allergies:       Dander no history of difficulty or pain with swallowing, reflux symptoms, vomiting, dark or bloody stools, constipation, yellowing of the skin, indigestion, nausea, change in bowel habits, diarrhea, abdominal pain or vomiting blood.      Genitourinary:  The patie The thyroid is not enlarged and is without palpable masses/nodules. There are no palpable masses. The trachea is in the midline. Conjunctiva are clear, non-icteric.     Chest: The chest expands symmetrically.  The lungs are clear to auscultation.     Heart: of DCIS, and with regard to future breast cancer risk was discussed with the patient.  For the 1st issue, I would recommend wire localization and surgical excision of the area in order to obtain a referral recommended a right breast wire localized excisiona

## 2018-10-15 NOTE — ANESTHESIA POSTPROCEDURE EVALUATION
Patient: Brandi Bhatia    Procedure Summary     Date:  10/15/18 Room / Location:  [address] [place] [address] / [address] [place] [address] MAIN OR    Anesthesia Start:  1801 Anesthesia Stop:      Procedure:  BREAST BIOPSY NEEDLE LOCALIZATION (Right Breast) Diagnosis:       Flat epithelial

## 2018-10-16 NOTE — PROCEDURES
[facility]  Procedure Note    Patricia Rangel Patient Status:  Outpatient    1970 MRN [id number]   Location [address] [facility] Attending Ethan Lamas MD   Hosp Day # 0 PCP Ariadna Ambriz MD     Procedure:

## 2018-10-17 ENCOUNTER — TELEPHONE (OUTPATIENT)
Dept: SURGERY | Facility: CLINIC | Age: 48
End: 2018-10-17

## 2018-10-17 NOTE — TELEPHONE ENCOUNTER
I contacted the patient to review Dr Campo Resides email regarding her pathology, that all the atypia was removed. I let her know Dr Radha Hebert would review it in detail at her post op appt. Pt appreciated the call, and was thankful.    She is feeling well after

## 2018-10-23 ENCOUNTER — OFFICE VISIT (OUTPATIENT)
Dept: SURGERY | Facility: CLINIC | Age: 48
End: 2018-10-23
Payer: COMMERCIAL

## 2018-10-23 VITALS
HEIGHT: 69 IN | OXYGEN SATURATION: 100 % | HEART RATE: 100 BPM | DIASTOLIC BLOOD PRESSURE: 84 MMHG | BODY MASS INDEX: 22.96 KG/M2 | SYSTOLIC BLOOD PRESSURE: 137 MMHG | RESPIRATION RATE: 18 BRPM | WEIGHT: 155 LBS

## 2018-10-23 DIAGNOSIS — N60.89 FLAT EPITHELIAL ATYPIA (FEA) OF BREAST, UNSPECIFIED LATERALITY: Primary | ICD-10-CM

## 2018-10-23 PROCEDURE — 99024 POSTOP FOLLOW-UP VISIT: CPT | Performed by: SURGERY

## 2018-10-24 NOTE — PROGRESS NOTES
Breast Surgery Post-Operative Visit    Diagnosis: Right breast flat epithelial atypia status post surgical excision on October 8, 2018.     Stage: N/A    Disease Status:  Surgical treatment complete, chemoprevention counseling and high risk surveillance pen achieve pregnancy. Medications:      Biotin 5000 MCG Oral Cap Take 1 tablet by mouth. Disp:  Rfl:    Magnesium 500 MG Oral Tab Take by mouth nightly. Disp:  Rfl:    Calcium Citrate 950 MG Oral Tab Take 1 tablet by mouth daily.  Disp:  Rfl:    Norethin Ac wheezing, difficulty in breathing with exertion, emphysema, chronic bronchitis, shortness of breath or abnormal sound when breathing. Cardiovascular:   There is no history of chest pain, chest pressure/discomfort, palpitations, irregular heartbeat, louise anaphylaxis.     /84 (BP Location: Left arm, Patient Position: Sitting, Cuff Size: adult)   Pulse 100   Resp 18   Ht 1.753 m (5' 9\")   Wt 70.3 kg (155 lb)   SpO2 100%   BMI 22.89 kg/m²     Physical Examination:   Examination shows that the surgical s establish a favorable risk versus benefit profile. We reviewed Everardoleonidas Ada Alford's risk, though she is a candidate tamoxifen for chemoprophylaxis at this time, we will defer given the small volume of atypia and concern of side effects at this time.      The p

## 2018-11-02 ENCOUNTER — APPOINTMENT (OUTPATIENT)
Dept: LAB | Age: 48
End: 2018-11-02
Attending: FAMILY MEDICINE
Payer: COMMERCIAL

## 2018-11-02 DIAGNOSIS — N30.00 ACUTE CYSTITIS WITHOUT HEMATURIA: ICD-10-CM

## 2018-11-02 PROCEDURE — 87086 URINE CULTURE/COLONY COUNT: CPT

## 2018-11-03 VITALS
DIASTOLIC BLOOD PRESSURE: 62 MMHG | BODY MASS INDEX: 24.44 KG/M2 | HEIGHT: 69 IN | SYSTOLIC BLOOD PRESSURE: 112 MMHG | TEMPERATURE: 98.1 F | HEART RATE: 76 BPM | WEIGHT: 165 LBS | RESPIRATION RATE: 12 BRPM

## 2018-11-06 ENCOUNTER — TELEPHONE (OUTPATIENT)
Dept: SURGERY | Facility: CLINIC | Age: 48
End: 2018-11-06

## 2018-11-06 NOTE — TELEPHONE ENCOUNTER
Pt called with questions about how her breast feels after her recent lumpectomy. She feels a little lump by her incision \"where you took the tissue out\", \"just not used to it. \"    She feels her breast is healing well.   Reassurance given that this is n

## 2018-12-11 ENCOUNTER — TELEPHONE (OUTPATIENT)
Dept: FAMILY MEDICINE CLINIC | Facility: CLINIC | Age: 48
End: 2018-12-11

## 2018-12-11 NOTE — TELEPHONE ENCOUNTER
Left message on answering machine to call triage. Pt will need an appt. If she is unable to come into office, we can recommend  [facility].

## 2018-12-21 ENCOUNTER — OFFICE VISIT (OUTPATIENT)
Dept: FAMILY MEDICINE CLINIC | Facility: CLINIC | Age: 48
End: 2018-12-21
Payer: COMMERCIAL

## 2018-12-21 VITALS
HEART RATE: 68 BPM | DIASTOLIC BLOOD PRESSURE: 86 MMHG | RESPIRATION RATE: 16 BRPM | SYSTOLIC BLOOD PRESSURE: 118 MMHG | TEMPERATURE: 98 F

## 2018-12-21 DIAGNOSIS — N30.00 ACUTE CYSTITIS WITHOUT HEMATURIA: Primary | ICD-10-CM

## 2018-12-21 PROCEDURE — 87186 SC STD MICRODIL/AGAR DIL: CPT | Performed by: NURSE PRACTITIONER

## 2018-12-21 PROCEDURE — 99213 OFFICE O/P EST LOW 20 MIN: CPT | Performed by: NURSE PRACTITIONER

## 2018-12-21 PROCEDURE — 87088 URINE BACTERIA CULTURE: CPT | Performed by: NURSE PRACTITIONER

## 2018-12-21 PROCEDURE — 81003 URINALYSIS AUTO W/O SCOPE: CPT | Performed by: NURSE PRACTITIONER

## 2018-12-21 PROCEDURE — 87086 URINE CULTURE/COLONY COUNT: CPT | Performed by: NURSE PRACTITIONER

## 2018-12-21 RX ORDER — ALPRAZOLAM 0.5 MG/1
0.5 TABLET ORAL
Refills: 5 | COMMUNITY
Start: 2018-12-06 | End: 2019-04-11

## 2018-12-21 RX ORDER — CIPROFLOXACIN 500 MG/1
500 TABLET, FILM COATED ORAL 2 TIMES DAILY
Qty: 6 TABLET | Refills: 0 | Status: SHIPPED | OUTPATIENT
Start: 2018-12-21 | End: 2019-04-26 | Stop reason: ALTCHOICE

## 2018-12-21 NOTE — PROGRESS NOTES
Patient presents with:  UTI: noticed foul smell last week denies drainage denies frquenct or urgent       HPI:  Presents with approx 1 week history of foul odor to urine. Denies dysuria, urgency, frequency, hematuria, abd pain, N/V/D or fever/chills.  Kinjal Howard Breath sounds clear bilaterally. No wheezes, rhonchi or rales  Abdominal: Soft, non-tender, non-distended w/o masses, no organomegaly or hernias. BS normoactive. Skin: Skin is warm and dry. No rash noted. No erythema. No pallor.        A/P:    Acute cysti

## 2019-03-15 ENCOUNTER — HOSPITAL ENCOUNTER (OUTPATIENT)
Dept: MAMMOGRAPHY | Facility: HOSPITAL | Age: 49
Discharge: HOME | End: 2019-03-15
Attending: SURGERY
Payer: COMMERCIAL

## 2019-03-15 DIAGNOSIS — N60.89 FLAT EPITHELIAL ATYPIA (FEA) OF BREAST, UNSPECIFIED LATERALITY: ICD-10-CM

## 2019-03-15 PROCEDURE — 77062 BREAST TOMOSYNTHESIS BI: CPT | Performed by: SURGERY

## 2019-03-15 PROCEDURE — 77066 DX MAMMO INCL CAD BI: CPT | Performed by: SURGERY

## 2019-03-25 ENCOUNTER — TELEPHONE (OUTPATIENT)
Dept: SURGERY | Facility: CLINIC | Age: 49
End: 2019-03-25

## 2019-03-25 NOTE — TELEPHONE ENCOUNTER
Called to obtain order for MRI. Reviewed notes from Dr. Guilherme Bergman and needs 6 month clinical exam due before scheduling breast MRI. Scheduled appt for April 16th at 0830 am in the NP office per pt preference. She was grateful for the assistance.

## 2019-04-12 RX ORDER — ALPRAZOLAM 0.5 MG/1
TABLET ORAL
Qty: 30 TABLET | Refills: 0 | OUTPATIENT
Start: 2019-04-12 | End: 2019-05-24

## 2019-04-12 NOTE — TELEPHONE ENCOUNTER
LOV 12/21/2018     Future Appointments   Date Time Provider Naseem Collazo   4/26/2019  8:30 AM Deandre Simmons MD [facility] EMG Surg/Onc        Refill request for:    Requested Prescriptions     Pending Prescriptions Disp Refills   • ALPRAZOLAM 0.5

## 2019-04-15 RX ORDER — ZOLPIDEM TARTRATE 5 MG/1
TABLET ORAL
Qty: 30 TABLET | Refills: 1 | OUTPATIENT
Start: 2019-04-15 | End: 2019-04-26 | Stop reason: ALTCHOICE

## 2019-04-15 NOTE — TELEPHONE ENCOUNTER
LOV 12/21/2018     Future Appointments   Date Time Provider Naseem Collazo   4/26/2019  8:30 AM Leonel Ramírez MD [facility] Surg/Onc        Refill request for:    Requested Prescriptions     Pending Prescriptions Disp Refills   • ZOLPIDEM TARTR

## 2019-04-22 ENCOUNTER — TELEPHONE (OUTPATIENT)
Dept: SURGERY | Facility: CLINIC | Age: 49
End: 2019-04-22

## 2019-04-26 ENCOUNTER — OFFICE VISIT (OUTPATIENT)
Dept: SURGERY | Facility: CLINIC | Age: 49
End: 2019-04-26
Payer: COMMERCIAL

## 2019-04-26 VITALS
SYSTOLIC BLOOD PRESSURE: 138 MMHG | OXYGEN SATURATION: 99 % | TEMPERATURE: 98 F | RESPIRATION RATE: 20 BRPM | DIASTOLIC BLOOD PRESSURE: 84 MMHG | HEART RATE: 84 BPM

## 2019-04-26 DIAGNOSIS — N60.81 FLAT EPITHELIAL ATYPIA (FEA) OF RIGHT BREAST: ICD-10-CM

## 2019-04-26 DIAGNOSIS — Z91.89 AT HIGH RISK FOR BREAST CANCER: Primary | ICD-10-CM

## 2019-04-26 PROCEDURE — 99214 OFFICE O/P EST MOD 30 MIN: CPT | Performed by: CLINICAL NURSE SPECIALIST

## 2019-04-26 NOTE — PROGRESS NOTES
Breast Surgery Surveillance Visit    Diagnosis: Right breast flat epithelial atypia status post surgical excision on October 8, 2018.     Stage: N/A    Disease Status:  Surgical treatment complete, chemoprevention counseling and high risk surveillance on-go denies infertility treatment to achieve pregnancy. Medications:      No outpatient medications have been marked as taking for the 4/26/19 encounter (Appointment) with ERIC López.     Allergies:      Dander                      Comment:dog dark or bloody stools, constipation, yellowing of the skin, indigestion, nausea, change in bowel habits, diarrhea, abdominal pain or vomiting blood.      Genitourinary:  The patient denies frequent urination, needing to get up at night to urinate, urinary h finding. The patient has recent lumpectomy changes from October with history of flat epithelial atypia (FEA).   There are a few adjacent benign appearing calcifications near the surgical clip, these do not have a similar appearance to the calcifications seen normal. There are no suspicious appearing rashes or lesions.     Extremities: The extremities are without deformity, cyanosis or edema.       Impression:   Ms. Giuseppe Cool is a 47year old woman presents with imaging detected right breast flat epithelia given the small volume of atypia and concern of side effects at this time. The patient was given ample opportunity for questions, and those questions were answered to her satisfaction.  She will be due for a right 6 month follow up mammogram in [facility]

## 2019-04-26 NOTE — PATIENT INSTRUCTIONS
Recent studies suggest that exercise and food choices may affect the risk for breast cancer.   The overall dietary pattern is important and it is the combination of foods working together including fruits, vegetables fiber and low amounts of saturated fat a Nuts or a tablespoon of  nut butters instead of less healthy sources of fat such as butter or trans-fats   Limit alcohol to no more than 4-5 glasses of wine/beer/2oz hard liquor per week.       There are exceptional free cancer resources available at [facility]

## 2019-04-27 ENCOUNTER — PATIENT OUTREACH (OUTPATIENT)
Dept: FAMILY MEDICINE CLINIC | Facility: CLINIC | Age: 49
End: 2019-04-27

## 2019-04-27 DIAGNOSIS — Z12.11 SCREENING FOR COLON CANCER: Primary | ICD-10-CM

## 2019-04-27 NOTE — PROGRESS NOTES
Please call patient to recommend colon CA screening. I know she has a lot on her plate (breast atypia). I would recommend colonoscopy -referral to Dr. Robinson Leon. But she could also do FIT testing this year if preferred. Thanks.

## 2019-05-13 ENCOUNTER — HOSPITAL ENCOUNTER (OUTPATIENT)
Dept: MRI IMAGING | Facility: HOSPITAL | Age: 49
Discharge: HOME | End: 2019-05-13
Attending: CLINICAL NURSE SPECIALIST
Payer: COMMERCIAL

## 2019-05-13 DIAGNOSIS — N60.81 FLAT EPITHELIAL ATYPIA (FEA) OF RIGHT BREAST: ICD-10-CM

## 2019-05-13 DIAGNOSIS — Z91.89 AT HIGH RISK FOR BREAST CANCER: ICD-10-CM

## 2019-05-13 PROCEDURE — A9575 INJ GADOTERATE MEGLUMI 0.1ML: HCPCS | Performed by: CLINICAL NURSE SPECIALIST

## 2019-05-13 PROCEDURE — 77049 MRI BREAST C-+ W/CAD BI: CPT | Performed by: CLINICAL NURSE SPECIALIST

## 2019-05-24 NOTE — PROGRESS NOTES
Patient presents with: Anxiety: refill Xanax  Sleep Problem: refill Zolpidem  Palpitations      HPI:  Presents for follow up of anxiety and insomnia which she has been managing with alprazolam as needed and zolpidem almost nightly to assist with sleep.  In Vitamins-Minerals (B COMPLEX VITAMINS PLUS) Oral Tab Take 1 tablet by mouth daily. Disp:  Rfl:    Cholecalciferol (VITAMIN D) 1000 UNITS Oral Tab Take 1 tablet by mouth daily.  Disp:  Rfl:        Physical Exam  /70 (BP Location: Left arm)   Pulse 80 as able. History of uti- check U/A with reflex culture to ensure resolution, although I suspect she would have worsening symptoms if treatment was not adequate.      Screening for colon cancer- Stated does not want to complete stool cards and would rat

## 2019-06-29 ENCOUNTER — LAB ENCOUNTER (OUTPATIENT)
Dept: LAB | Age: 49
End: 2019-06-29
Attending: NURSE PRACTITIONER
Payer: COMMERCIAL

## 2019-06-29 DIAGNOSIS — Z87.440 HISTORY OF UTI: ICD-10-CM

## 2019-06-29 LAB
BILIRUB UR QL STRIP.AUTO: NEGATIVE
CLARITY UR REFRACT.AUTO: CLEAR
COLOR UR AUTO: YELLOW
GLUCOSE UR STRIP.AUTO-MCNC: NEGATIVE MG/DL
KETONES UR STRIP.AUTO-MCNC: 40 MG/DL
NITRITE UR QL STRIP.AUTO: POSITIVE
PH UR STRIP.AUTO: 6.5 [PH] (ref 4.5–8)
PROT UR STRIP.AUTO-MCNC: NEGATIVE MG/DL
RBC UR QL AUTO: NEGATIVE
SP GR UR STRIP.AUTO: 1.01 (ref 1–1.03)
UROBILINOGEN UR STRIP.AUTO-MCNC: 0.2 MG/DL

## 2019-06-29 PROCEDURE — 81001 URINALYSIS AUTO W/SCOPE: CPT

## 2019-06-29 PROCEDURE — 81015 MICROSCOPIC EXAM OF URINE: CPT

## 2019-07-08 DIAGNOSIS — R82.998 LEUKOCYTES IN URINE: Primary | ICD-10-CM

## 2019-07-08 NOTE — PROGRESS NOTES
There was supposed to be a reflex culture done with this. I do not see it in Epic. Please call lab and see if a culture was done. If not we need to recollect U/A WITH CULTURE this time as it seems she may have UTI. Thanks.

## 2019-07-09 NOTE — PROGRESS NOTES
Lab did not run a urine culture because the UA did not meet the proper criteria. It is too late to run a culture on the urine previously collected.  order placed. Patient will need to return to lab.      Left message at home number for patient to call luis

## 2019-07-29 ENCOUNTER — APPOINTMENT (OUTPATIENT)
Dept: LAB | Age: 49
End: 2019-07-29
Attending: NURSE PRACTITIONER
Payer: COMMERCIAL

## 2019-07-29 DIAGNOSIS — R82.998 LEUKOCYTES IN URINE: ICD-10-CM

## 2019-07-29 PROCEDURE — 87086 URINE CULTURE/COLONY COUNT: CPT

## 2019-07-29 PROCEDURE — 87088 URINE BACTERIA CULTURE: CPT

## 2019-07-29 PROCEDURE — 87186 SC STD MICRODIL/AGAR DIL: CPT

## 2019-08-01 RX ORDER — ZOLPIDEM TARTRATE 5 MG/1
5 TABLET ORAL NIGHTLY PRN
Qty: 30 TABLET | Refills: 0 | Status: SHIPPED | OUTPATIENT
Start: 2019-08-01 | End: 2019-10-07

## 2019-08-01 NOTE — TELEPHONE ENCOUNTER
Patient requesting refill of Ambien  Last script 5/24/2019    Patient states she will be following up soon , but the appointment is not yet scheduled.     Request Routed to HERI Phipps

## 2019-08-26 ENCOUNTER — TELEPHONE (OUTPATIENT)
Dept: FAMILY MEDICINE CLINIC | Facility: CLINIC | Age: 49
End: 2019-08-26

## 2019-08-26 NOTE — TELEPHONE ENCOUNTER
Patient called requesting to speak with nurse, requesting a script for Lexapro, states was on a long time ago and wants to know if dr reich would prescribe again?

## 2019-08-26 NOTE — TELEPHONE ENCOUNTER
LOV 5/24/19. Future Appointments   Date Time Provider Naseem Collazo   9/16/2019  7:00 AM Ronda Jensen MD [facility] ECC SUB GI   9/23/2019  7:20 AM [facility] RM[facility] [address] [facility],      Pt is asking to restart Lexapro (which she was on a long time ago.

## 2019-08-26 NOTE — TELEPHONE ENCOUNTER
Left message on answering machine to call triage. Pt needs appt to assess symptoms and possibly start antidepressant.

## 2019-08-26 NOTE — TELEPHONE ENCOUNTER
Agree she should start medication but MUST have OV for eval. Ok to start escitalopram 10mg daily #30 with no refills.  Needs appointment on file for next 21 days to have script called in please and will need face to face visit with either me or Amarilis for

## 2019-09-05 NOTE — TELEPHONE ENCOUNTER
LOV 5/24/2019     Patient was asked to follow-up in: Follow-up timeframe not documented in note      Appointment scheduled: Visit date not found     Refill request for:    Requested Prescriptions     Pending Prescriptions Disp Refills   • ALPRAZOLAM 0.5 MG

## 2019-09-06 ENCOUNTER — TELEPHONE (OUTPATIENT)
Dept: FAMILY MEDICINE CLINIC | Facility: CLINIC | Age: 49
End: 2019-09-06

## 2019-09-06 RX ORDER — ALPRAZOLAM 0.5 MG/1
TABLET ORAL
Qty: 30 TABLET | Refills: 1 | OUTPATIENT
Start: 2019-09-06

## 2019-09-06 RX ORDER — ALPRAZOLAM 0.5 MG/1
TABLET ORAL
Qty: 30 TABLET | Refills: 1 | Status: SHIPPED | OUTPATIENT
Start: 2019-09-06 | End: 2019-11-18

## 2019-09-06 NOTE — TELEPHONE ENCOUNTER
Faxed [facility] medication refill for Alprazolam 0.5 MG, per Lety Cespedes NP.   Confirmation received 10:26am.

## 2019-09-09 ENCOUNTER — TELEPHONE (OUTPATIENT)
Dept: FAMILY MEDICINE CLINIC | Facility: CLINIC | Age: 49
End: 2019-09-09

## 2019-09-09 DIAGNOSIS — Z13.0 SCREENING FOR ENDOCRINE, METABOLIC AND IMMUNITY DISORDER: ICD-10-CM

## 2019-09-09 DIAGNOSIS — Z13.0 SCREENING, ANEMIA, DEFICIENCY, IRON: Primary | ICD-10-CM

## 2019-09-09 DIAGNOSIS — Z13.228 SCREENING FOR ENDOCRINE, METABOLIC AND IMMUNITY DISORDER: ICD-10-CM

## 2019-09-09 DIAGNOSIS — R00.2 PALPITATION: Primary | ICD-10-CM

## 2019-09-09 DIAGNOSIS — Z13.29 SCREENING FOR THYROID DISORDER: ICD-10-CM

## 2019-09-09 DIAGNOSIS — Z13.220 SCREENING, LIPID: ICD-10-CM

## 2019-09-09 DIAGNOSIS — Z13.29 SCREENING FOR ENDOCRINE, METABOLIC AND IMMUNITY DISORDER: ICD-10-CM

## 2019-09-09 NOTE — TELEPHONE ENCOUNTER
Alex Gil, patient last seen by you 5/24/19 she is requesting to see cardiology for palpitations. Does patient need to return first to see you or can we referral to cardiology? If so who would you recommend for patient? Thank you.

## 2019-09-09 NOTE — TELEPHONE ENCOUNTER
Pt requesting a referral to Cardiologist and she has been having palpitations she is concerned about.  Has discussed with Alber Mccoy already

## 2019-09-09 NOTE — TELEPHONE ENCOUNTER
Please enter lab orders for the patient's upcoming physical appointment. Physical scheduled:    Your appointments     Date & Time Appointment Department [facility])    Sep 23, 2019  2:30 PM CDT Physical - Established Patient with JORDANA Coleman

## 2019-09-10 NOTE — TELEPHONE ENCOUNTER
patient has appointment with Venkat Tomas PA-c 9/23/2019.  I told her Nidia Sandip LIRIANO wants her seen before he will send in referral she pointed out the up coing appointment and just wants a referral so she asked me to contact Amarilis to see if she would g

## 2019-09-10 NOTE — TELEPHONE ENCOUNTER
Elba Hawkins  [address] [phone]     Called patient and discussed getting testing done at physical but also gave her contact information to Dr Obie Culp

## 2019-09-10 NOTE — TELEPHONE ENCOUNTER
It would be more appropriate if we started the workup 1st. We can order tests, if needed, at her physical appt. It can take many months to get into cardiology. If she is insistent, please give her Dr. Lorena Low information.

## 2019-09-19 PROBLEM — Z12.11 SPECIAL SCREENING FOR MALIGNANT NEOPLASM OF COLON: Status: ACTIVE | Noted: 2019-09-19

## 2019-09-20 RX ORDER — NORETHINDRONE ACETATE AND ETHINYL ESTRADIOL AND FERROUS FUMARATE 1MG-20(21)
KIT ORAL
Qty: 84 TABLET | Refills: 0 | Status: SHIPPED | OUTPATIENT
Start: 2019-09-20 | End: 2019-11-18

## 2019-09-20 NOTE — TELEPHONE ENCOUNTER
Requested Prescriptions     Pending Prescriptions Disp Refills   • JUNEL FE 1/20 1-20 MG-MCG Oral Tab [Pharmacy Med Name: William Ferrer CAROLIN 1 MG-20 MCG TABLET] 84 tablet 3     Sig: TAKE 1 TABLET BY MOUTH EVERY DAY     LOV 5/24/2019   Last Pap completed 11/7/16  Tenzin

## 2019-09-23 ENCOUNTER — HOSPITAL ENCOUNTER (OUTPATIENT)
Dept: MAMMOGRAPHY | Facility: HOSPITAL | Age: 49
Discharge: HOME | End: 2019-09-23
Attending: SURGERY
Payer: COMMERCIAL

## 2019-09-23 DIAGNOSIS — N60.89 FLAT EPITHELIAL ATYPIA (FEA) OF BREAST, UNSPECIFIED LATERALITY: Primary | ICD-10-CM

## 2019-09-23 DIAGNOSIS — N60.81 FLAT EPITHELIAL ATYPIA (FEA) OF RIGHT BREAST: ICD-10-CM

## 2019-09-23 DIAGNOSIS — Z91.89 AT HIGH RISK FOR BREAST CANCER: ICD-10-CM

## 2019-09-23 PROCEDURE — 77065 DX MAMMO INCL CAD UNI: CPT | Performed by: SURGERY

## 2019-09-23 PROCEDURE — 77061 BREAST TOMOSYNTHESIS UNI: CPT | Performed by: SURGERY

## 2019-09-24 ENCOUNTER — LAB ENCOUNTER (OUTPATIENT)
Dept: LAB | Age: 49
End: 2019-09-24
Attending: FAMILY MEDICINE
Payer: COMMERCIAL

## 2019-09-24 DIAGNOSIS — Z13.29 SCREENING FOR THYROID DISORDER: ICD-10-CM

## 2019-09-24 DIAGNOSIS — Z13.0 SCREENING FOR ENDOCRINE, METABOLIC AND IMMUNITY DISORDER: ICD-10-CM

## 2019-09-24 DIAGNOSIS — Z13.220 SCREENING, LIPID: ICD-10-CM

## 2019-09-24 DIAGNOSIS — Z13.0 SCREENING, ANEMIA, DEFICIENCY, IRON: ICD-10-CM

## 2019-09-24 DIAGNOSIS — Z13.29 SCREENING FOR ENDOCRINE, METABOLIC AND IMMUNITY DISORDER: ICD-10-CM

## 2019-09-24 DIAGNOSIS — Z13.228 SCREENING FOR ENDOCRINE, METABOLIC AND IMMUNITY DISORDER: ICD-10-CM

## 2019-09-24 LAB
ALBUMIN SERPL-MCNC: 3.6 G/DL (ref 3.4–5)
ALBUMIN/GLOB SERPL: 1.1 {RATIO} (ref 1–2)
ALP LIVER SERPL-CCNC: 42 U/L (ref 39–100)
ALT SERPL-CCNC: 17 U/L (ref 13–56)
ANION GAP SERPL CALC-SCNC: 8 MMOL/L (ref 0–18)
AST SERPL-CCNC: 14 U/L (ref 15–37)
BASOPHILS # BLD AUTO: 0.09 X10(3) UL (ref 0–0.2)
BASOPHILS NFR BLD AUTO: 1.4 %
BILIRUB SERPL-MCNC: 0.6 MG/DL (ref 0.1–2)
BUN BLD-MCNC: 9 MG/DL (ref 7–18)
BUN/CREAT SERPL: 12.9 (ref 10–20)
CALCIUM BLD-MCNC: 8.9 MG/DL (ref 8.5–10.1)
CHLORIDE SERPL-SCNC: 109 MMOL/L (ref 98–112)
CHOLEST SMN-MCNC: 189 MG/DL (ref ?–200)
CO2 SERPL-SCNC: 21 MMOL/L (ref 21–32)
CREAT BLD-MCNC: 0.7 MG/DL (ref 0.55–1.02)
DEPRECATED RDW RBC AUTO: 44.9 FL (ref 35.1–46.3)
EOSINOPHIL # BLD AUTO: 0.27 X10(3) UL (ref 0–0.7)
EOSINOPHIL NFR BLD AUTO: 4.1 %
ERYTHROCYTE [DISTWIDTH] IN BLOOD BY AUTOMATED COUNT: 12.1 % (ref 11–15)
GLOBULIN PLAS-MCNC: 3.4 G/DL (ref 2.8–4.4)
GLUCOSE BLD-MCNC: 83 MG/DL (ref 70–99)
HCT VFR BLD AUTO: 41.6 % (ref 35–48)
HDLC SERPL-MCNC: 93 MG/DL (ref 40–59)
HGB BLD-MCNC: 13.8 G/DL (ref 12–16)
IMM GRANULOCYTES # BLD AUTO: 0.04 X10(3) UL (ref 0–1)
IMM GRANULOCYTES NFR BLD: 0.6 %
LDLC SERPL CALC-MCNC: 85 MG/DL (ref ?–100)
LYMPHOCYTES # BLD AUTO: 1.58 X10(3) UL (ref 1–4)
LYMPHOCYTES NFR BLD AUTO: 23.8 %
M PROTEIN MFR SERPL ELPH: 7 G/DL (ref 6.4–8.2)
MCH RBC QN AUTO: 33.3 PG (ref 26–34)
MCHC RBC AUTO-ENTMCNC: 33.2 G/DL (ref 31–37)
MCV RBC AUTO: 100.2 FL (ref 80–100)
MONOCYTES # BLD AUTO: 0.56 X10(3) UL (ref 0.1–1)
MONOCYTES NFR BLD AUTO: 8.4 %
NEUTROPHILS # BLD AUTO: 4.1 X10 (3) UL (ref 1.5–7.7)
NEUTROPHILS # BLD AUTO: 4.1 X10(3) UL (ref 1.5–7.7)
NEUTROPHILS NFR BLD AUTO: 61.7 %
NONHDLC SERPL-MCNC: 96 MG/DL (ref ?–130)
OSMOLALITY SERPL CALC.SUM OF ELEC: 284 MOSM/KG (ref 275–295)
PLATELET # BLD AUTO: 277 10(3)UL (ref 150–450)
POTASSIUM SERPL-SCNC: 4.7 MMOL/L (ref 3.5–5.1)
RBC # BLD AUTO: 4.15 X10(6)UL (ref 3.8–5.3)
SODIUM SERPL-SCNC: 138 MMOL/L (ref 136–145)
TRIGL SERPL-MCNC: 54 MG/DL (ref 30–149)
TSI SER-ACNC: 1.45 MIU/ML (ref 0.36–3.74)
VLDLC SERPL CALC-MCNC: 11 MG/DL (ref 0–30)
WBC # BLD AUTO: 6.6 X10(3) UL (ref 4–11)

## 2019-09-24 PROCEDURE — 85025 COMPLETE CBC W/AUTO DIFF WBC: CPT

## 2019-09-24 PROCEDURE — 84443 ASSAY THYROID STIM HORMONE: CPT

## 2019-09-24 PROCEDURE — 80061 LIPID PANEL: CPT

## 2019-09-24 PROCEDURE — 36415 COLL VENOUS BLD VENIPUNCTURE: CPT

## 2019-09-24 PROCEDURE — 80053 COMPREHEN METABOLIC PANEL: CPT

## 2019-10-05 NOTE — TELEPHONE ENCOUNTER
LOV 5/24/2019 with Andrez Cockayne, FNP     Patient was asked to follow-up in: Follow-up timeframe not documented in note     Appointment scheduled: 10/21/2019 Madelyn Lal PA-C     Refill request for:    Requested Prescriptions     Pending Prescriptions Ludin Sommer

## 2019-10-05 NOTE — TELEPHONE ENCOUNTER
Patient only has 3 tabs left of Zolpidem Tartrate 5 MG Oral Tab. Patient needs to script sent to [facility]. Patient had to reschedule her appointment to 10/21 due to work conflict.

## 2019-10-07 ENCOUNTER — TELEPHONE (OUTPATIENT)
Dept: FAMILY MEDICINE CLINIC | Facility: CLINIC | Age: 49
End: 2019-10-07

## 2019-10-07 RX ORDER — ZOLPIDEM TARTRATE 5 MG/1
5 TABLET ORAL NIGHTLY PRN
Qty: 30 TABLET | Refills: 0 | Status: SHIPPED | OUTPATIENT
Start: 2019-10-07 | End: 2019-11-11

## 2019-10-07 NOTE — TELEPHONE ENCOUNTER
Faxed [facility] pharmacy Zolpidem 5 MG, per Payal Chan NP.   Received confirmation 9:37am.

## 2019-11-11 ENCOUNTER — TELEPHONE (OUTPATIENT)
Dept: FAMILY MEDICINE CLINIC | Facility: CLINIC | Age: 49
End: 2019-11-11

## 2019-11-11 RX ORDER — ZOLPIDEM TARTRATE 5 MG/1
5 TABLET ORAL NIGHTLY PRN
Qty: 30 TABLET | Refills: 0 | Status: SHIPPED | OUTPATIENT
Start: 2019-11-11 | End: 2019-11-18

## 2019-11-11 NOTE — TELEPHONE ENCOUNTER
Please enter lab orders for the patient's upcoming physical appointment. Physical scheduled:    Your appointments     Date & Time Appointment Department [facility])    Nov 18, 2019  3:45 PM CST Physical - Established with Sagrario Lal PA-C [employer]

## 2019-11-11 NOTE — TELEPHONE ENCOUNTER
Patient called requesting a temporary refill for the Zolpidem Tartrate 5 MG Oral Tab, states is completely out of the medication.  She has an upcoming appointment for 11/18 with [facility]

## 2019-11-18 ENCOUNTER — OFFICE VISIT (OUTPATIENT)
Dept: FAMILY MEDICINE CLINIC | Facility: CLINIC | Age: 49
End: 2019-11-18
Payer: COMMERCIAL

## 2019-11-18 VITALS
BODY MASS INDEX: 23.4 KG/M2 | SYSTOLIC BLOOD PRESSURE: 124 MMHG | TEMPERATURE: 99 F | RESPIRATION RATE: 20 BRPM | DIASTOLIC BLOOD PRESSURE: 84 MMHG | HEART RATE: 100 BPM | HEIGHT: 69 IN | WEIGHT: 158 LBS

## 2019-11-18 DIAGNOSIS — Z12.4 SCREENING FOR CERVICAL CANCER: ICD-10-CM

## 2019-11-18 DIAGNOSIS — Z11.51 SCREENING FOR HUMAN PAPILLOMAVIRUS (HPV): ICD-10-CM

## 2019-11-18 DIAGNOSIS — Z00.00 WELL ADULT EXAM: Primary | ICD-10-CM

## 2019-11-18 DIAGNOSIS — R82.90 FOUL SMELLING URINE: ICD-10-CM

## 2019-11-18 PROCEDURE — 87086 URINE CULTURE/COLONY COUNT: CPT | Performed by: FAMILY MEDICINE

## 2019-11-18 PROCEDURE — 87077 CULTURE AEROBIC IDENTIFY: CPT | Performed by: FAMILY MEDICINE

## 2019-11-18 PROCEDURE — 88175 CYTOPATH C/V AUTO FLUID REDO: CPT | Performed by: FAMILY MEDICINE

## 2019-11-18 PROCEDURE — 99396 PREV VISIT EST AGE 40-64: CPT | Performed by: FAMILY MEDICINE

## 2019-11-18 PROCEDURE — 87624 HPV HI-RISK TYP POOLED RSLT: CPT | Performed by: FAMILY MEDICINE

## 2019-11-18 PROCEDURE — 87186 SC STD MICRODIL/AGAR DIL: CPT | Performed by: FAMILY MEDICINE

## 2019-11-18 RX ORDER — BUPROPION HYDROCHLORIDE 150 MG/1
150 TABLET ORAL DAILY
Qty: 30 TABLET | Refills: 2 | Status: SHIPPED | OUTPATIENT
Start: 2019-11-18 | End: 2020-02-04

## 2019-11-18 RX ORDER — ZOLPIDEM TARTRATE 5 MG/1
5 TABLET ORAL NIGHTLY PRN
Qty: 90 TABLET | Refills: 1 | Status: SHIPPED | OUTPATIENT
Start: 2019-11-18 | End: 2020-02-11

## 2019-11-18 RX ORDER — NORETHINDRONE ACETATE AND ETHINYL ESTRADIOL 1MG-20(21)
1 KIT ORAL
Qty: 84 TABLET | Refills: 3 | Status: SHIPPED | OUTPATIENT
Start: 2019-11-18 | End: 2020-09-14

## 2019-11-18 RX ORDER — ALPRAZOLAM 0.5 MG/1
0.5 TABLET ORAL 3 TIMES DAILY PRN
Qty: 30 TABLET | Refills: 3 | Status: SHIPPED | OUTPATIENT
Start: 2019-11-18 | End: 2020-02-11

## 2019-11-18 NOTE — PROGRESS NOTES
:HPI:   Nate Aguilar is a 47year old female who presents for a complete physical exam.    Wt Readings from Last 3 Encounters:  11/18/19 : 158 lb (71.7 kg)  09/13/19 : 158 lb (71.7 kg)  05/24/19 : 160 lb (72.6 kg)    Body mass index is 23.33 kg/m². by mouth daily. • Cholecalciferol (VITAMIN D) 1000 UNITS Oral Tab Take 1 tablet by mouth daily. • Nitrofurantoin Monohyd Macro (MACROBID) 100 MG Oral Cap Take 1 capsule (100 mg total) by mouth 2 (two) times daily.  14 capsule 0      Past Medical His denies hx of anemia  ENDOCRINE: denies thyroid history  ALL/ASTHMA: denies hx of allergy or asthma    EXAM:   /84 (BP Location: Left arm)   Pulse 100   Temp 98.6 °F (37 °C) (Oral)   Resp 20   Ht 69\"   Wt 158 lb (71.7 kg)   BMI 23.33 kg/m²   Body mas 09:05 AM           Lipids  (most recent labs)   Lab Results   Component Value Date/Time    CHOLEST 189 09/24/2019 09:05 AM    TRIG 54 09/24/2019 09:05 AM    HDL 93 (H) 09/24/2019 09:05 AM    LDL 85 09/24/2019 09:05 AM    NONHDLC 96 09/24/2019 09:05 AM

## 2019-11-20 DIAGNOSIS — N30.00 ACUTE CYSTITIS WITHOUT HEMATURIA: Primary | ICD-10-CM

## 2019-11-20 RX ORDER — NITROFURANTOIN 25; 75 MG/1; MG/1
100 CAPSULE ORAL 2 TIMES DAILY
Qty: 14 CAPSULE | Refills: 0 | Status: SHIPPED | OUTPATIENT
Start: 2019-11-20 | End: 2019-12-03

## 2019-11-25 ENCOUNTER — TELEPHONE (OUTPATIENT)
Dept: FAMILY MEDICINE CLINIC | Facility: CLINIC | Age: 49
End: 2019-11-25

## 2019-11-25 DIAGNOSIS — R21 RASH AND OTHER NONSPECIFIC SKIN ERUPTION: Primary | ICD-10-CM

## 2019-11-25 NOTE — TELEPHONE ENCOUNTER
Sounds flu-like. Unlikely to be related to UTI as antibiotic should have taken care of infection per urine culture. Repeat culture in 2 weeks. Recommend flu symptom recommendations.

## 2019-11-25 NOTE — TELEPHONE ENCOUNTER
Pt states reviewed test results on her MY CHART- Dx with Ecoli infection started on Macrobid. Pt has 1 more day of Macrobid. Pt states urine no longer has foul smell, no burning/pain on urination, no cold symptoms.   Pt c/o body aches/chills and fever 100-1

## 2019-11-29 ENCOUNTER — APPOINTMENT (OUTPATIENT)
Dept: GENERAL RADIOLOGY | Age: 49
End: 2019-11-29
Attending: PHYSICIAN ASSISTANT
Payer: COMMERCIAL

## 2019-11-29 ENCOUNTER — HOSPITAL ENCOUNTER (EMERGENCY)
Age: 49
Discharge: HOME | End: 2019-11-29
Attending: EMERGENCY MEDICINE
Payer: COMMERCIAL

## 2019-11-29 VITALS
TEMPERATURE: 98 F | OXYGEN SATURATION: 100 % | SYSTOLIC BLOOD PRESSURE: 163 MMHG | HEART RATE: 90 BPM | BODY MASS INDEX: 23 KG/M2 | RESPIRATION RATE: 16 BRPM | DIASTOLIC BLOOD PRESSURE: 95 MMHG | WEIGHT: 158.06 LBS

## 2019-11-29 DIAGNOSIS — B34.9 VIRAL SYNDROME: Primary | ICD-10-CM

## 2019-11-29 PROCEDURE — 99284 EMERGENCY DEPT VISIT MOD MDM: CPT

## 2019-11-29 PROCEDURE — 87040 BLOOD CULTURE FOR BACTERIA: CPT | Performed by: PHYSICIAN ASSISTANT

## 2019-11-29 PROCEDURE — 71046 X-RAY EXAM CHEST 2 VIEWS: CPT | Performed by: PHYSICIAN ASSISTANT

## 2019-11-29 PROCEDURE — 99283 EMERGENCY DEPT VISIT LOW MDM: CPT

## 2019-11-29 PROCEDURE — 80048 BASIC METABOLIC PNL TOTAL CA: CPT | Performed by: PHYSICIAN ASSISTANT

## 2019-11-29 PROCEDURE — 36415 COLL VENOUS BLD VENIPUNCTURE: CPT

## 2019-11-29 PROCEDURE — 81003 URINALYSIS AUTO W/O SCOPE: CPT | Performed by: PHYSICIAN ASSISTANT

## 2019-11-29 PROCEDURE — 87502 INFLUENZA DNA AMP PROBE: CPT | Performed by: PHYSICIAN ASSISTANT

## 2019-11-29 PROCEDURE — 85025 COMPLETE CBC W/AUTO DIFF WBC: CPT | Performed by: PHYSICIAN ASSISTANT

## 2019-11-29 NOTE — ED PROVIDER NOTES
Patient Seen in: [facility] Emergency Department In [place]      History   Patient presents with:  Fever (infectious)    Stated Complaint: fever    13-year-old  female without significant past medical history presents to the ER today with complai 71.7 kg   SpO2 100%   BMI 23.34 kg/m²         Physical Exam  Vitals signs and nursing note reviewed. Constitutional:       General: She is not in acute distress. Appearance: Normal appearance. She is normal weight.  She is not ill-appearing, toxic-smita (*)     All other components within normal limits   POCT FLU TEST - Normal    Narrative: This assay is a rapid molecular in vitro test utilizing nucleic acid amplification of influenza A and B viral RNA.    CBC WITH DIFFERENTIAL WITH PLATELET    Narrati Approved by: Juno Ballesteros MD on 11/29/2019 at 13:44            MDM   This patient is very comfortable and in NAD.                 Disposition and Plan     Clinical Impression:  Viral syndrome  (primary encounter diagnosis)    Disposition:  There is no

## 2019-11-29 NOTE — ED INITIAL ASSESSMENT (HPI)
C/o intermittent fever for 6 days. Highest temp 101 last night. C/o slight cough for 2 days.  No specific pains

## 2019-12-03 ENCOUNTER — TELEPHONE (OUTPATIENT)
Dept: FAMILY MEDICINE CLINIC | Facility: CLINIC | Age: 49
End: 2019-12-03

## 2019-12-03 ENCOUNTER — HOSPITAL ENCOUNTER (EMERGENCY)
Age: 49
Discharge: HOME | End: 2019-12-03
Attending: EMERGENCY MEDICINE
Payer: COMMERCIAL

## 2019-12-03 VITALS
BODY MASS INDEX: 23.4 KG/M2 | DIASTOLIC BLOOD PRESSURE: 77 MMHG | RESPIRATION RATE: 16 BRPM | HEIGHT: 69 IN | SYSTOLIC BLOOD PRESSURE: 124 MMHG | TEMPERATURE: 99 F | WEIGHT: 158 LBS | OXYGEN SATURATION: 100 % | HEART RATE: 74 BPM

## 2019-12-03 DIAGNOSIS — T78.40XA ALLERGIC REACTION, INITIAL ENCOUNTER: Primary | ICD-10-CM

## 2019-12-03 PROCEDURE — 85652 RBC SED RATE AUTOMATED: CPT | Performed by: EMERGENCY MEDICINE

## 2019-12-03 PROCEDURE — 85025 COMPLETE CBC W/AUTO DIFF WBC: CPT | Performed by: EMERGENCY MEDICINE

## 2019-12-03 PROCEDURE — 96361 HYDRATE IV INFUSION ADD-ON: CPT

## 2019-12-03 PROCEDURE — S0028 INJECTION, FAMOTIDINE, 20 MG: HCPCS | Performed by: EMERGENCY MEDICINE

## 2019-12-03 PROCEDURE — 96376 TX/PRO/DX INJ SAME DRUG ADON: CPT

## 2019-12-03 PROCEDURE — 99284 EMERGENCY DEPT VISIT MOD MDM: CPT

## 2019-12-03 PROCEDURE — 80053 COMPREHEN METABOLIC PANEL: CPT | Performed by: EMERGENCY MEDICINE

## 2019-12-03 PROCEDURE — 96375 TX/PRO/DX INJ NEW DRUG ADDON: CPT

## 2019-12-03 PROCEDURE — 96374 THER/PROPH/DIAG INJ IV PUSH: CPT

## 2019-12-03 RX ORDER — FAMOTIDINE 10 MG/ML
20 INJECTION, SOLUTION INTRAVENOUS ONCE
Status: COMPLETED | OUTPATIENT
Start: 2019-12-03 | End: 2019-12-03

## 2019-12-03 RX ORDER — DIPHENHYDRAMINE HYDROCHLORIDE 50 MG/ML
25 INJECTION INTRAMUSCULAR; INTRAVENOUS ONCE
Status: COMPLETED | OUTPATIENT
Start: 2019-12-03 | End: 2019-12-03

## 2019-12-03 RX ORDER — HYDROXYZINE HYDROCHLORIDE 25 MG/1
TABLET, FILM COATED ORAL EVERY 4 HOURS PRN
Qty: 20 TABLET | Refills: 0 | Status: SHIPPED | OUTPATIENT
Start: 2019-12-03 | End: 2020-01-02

## 2019-12-03 RX ORDER — PREDNISONE 20 MG/1
40 TABLET ORAL DAILY
Qty: 6 TABLET | Refills: 0 | Status: SHIPPED | OUTPATIENT
Start: 2019-12-03 | End: 2019-12-06

## 2019-12-03 RX ORDER — METHYLPREDNISOLONE SODIUM SUCCINATE 125 MG/2ML
125 INJECTION, POWDER, LYOPHILIZED, FOR SOLUTION INTRAMUSCULAR; INTRAVENOUS ONCE
Status: COMPLETED | OUTPATIENT
Start: 2019-12-03 | End: 2019-12-03

## 2019-12-03 NOTE — TELEPHONE ENCOUNTER
This AM had rash went to Ed they told her this might be auto-immune trouble and that she needed the labs from her PCP.  There had been some questions that she had an auto-immune problem like lupus or lymphoma 15 yrs ago she is asking to blood tests related

## 2019-12-03 NOTE — ED PROVIDER NOTES
Patient Seen in: [facility] Emergency Department In [place]      History   Patient presents with:  Rash Skin Problem    Stated Complaint: Rash that is itchy. No fever     HPI    Patient presents with a rash.   The patient states that she was here last week reviewed and negative except as noted above. Physical Exam     ED Triage Vitals [12/03/19 0707]   /81   Pulse 84   Resp 16   Temp 98.9 °F (37.2 °C)   Temp src    SpO2 100 %   O2 Device None (Room air)       Current:/77   Pulse 74   Temp 98. 9 injection 20 mg (20 mg Intravenous Given 12/3/19 8540)   sodium chloride 0.9% IV bolus 1,000 mL (0 mL Intravenous Stopped 12/3/19 0906)   diphenhydrAMINE HCl (BENADRYL) injection 25 mg (25 mg Intravenous Given 12/3/19 2157)     The patient was given the ab

## 2019-12-04 NOTE — TELEPHONE ENCOUNTER
Sed rate, CRP, ESTEFANI, rheumatoid factor please. Probably wait until done with steroids before having labs. Though to me, this still sounds like a virus. Some viral syndromes can be followed by a rash.

## 2019-12-06 ENCOUNTER — OFFICE VISIT (OUTPATIENT)
Dept: FAMILY MEDICINE CLINIC | Facility: CLINIC | Age: 49
End: 2019-12-06
Payer: COMMERCIAL

## 2019-12-06 VITALS
BODY MASS INDEX: 23.62 KG/M2 | HEART RATE: 70 BPM | RESPIRATION RATE: 16 BRPM | HEIGHT: 70 IN | DIASTOLIC BLOOD PRESSURE: 70 MMHG | TEMPERATURE: 98 F | WEIGHT: 165 LBS | SYSTOLIC BLOOD PRESSURE: 120 MMHG

## 2019-12-06 DIAGNOSIS — N39.0 RECURRENT UTI: ICD-10-CM

## 2019-12-06 DIAGNOSIS — R21 RASH: Primary | ICD-10-CM

## 2019-12-06 PROCEDURE — 99213 OFFICE O/P EST LOW 20 MIN: CPT | Performed by: PHYSICIAN ASSISTANT

## 2019-12-06 NOTE — PATIENT INSTRUCTIONS
In the future, you can use benadryl, allergy pill (like claritin) and an acid blocker (such as pepcid).     Between Saturday 12/21- 1/4 go get labs/ repeat urine culture done (1-2 weeks after finishing prednisone)

## 2019-12-09 RX ORDER — BUPROPION HYDROCHLORIDE 150 MG/1
TABLET ORAL
Qty: 90 TABLET | Refills: 0 | OUTPATIENT
Start: 2019-12-09

## 2019-12-09 NOTE — PROGRESS NOTES
Patient presents with:  Rash: Follow Up on Rash, Derm states it could of been a reaction to [address]  Massiel Shaw is a 47year old female who presents for follow up of rash.  She initially presented to ER on 12/3/19 for ev 2  •  Econazole Nitrate 1 % External Cream, Apply to left foot twice daily for 3 weeks, Disp: 30 g, Rfl: 2  •  Biotin 5000 MCG Oral Cap, Take 1 tablet by mouth., Disp: , Rfl:   •  Magnesium 500 MG Oral Tab, Take by mouth nightly., Disp: , Rfl:   •  Calcium she can let us know but would prefer that she finish entire course. Follow up here as needed.    (N39.0) Recurrent UTI    Plan: Question if she could be colonized with E. Coli with having the asymptomatic infection.  Recommend that she see urology to determ

## 2019-12-30 ENCOUNTER — LAB ENCOUNTER (OUTPATIENT)
Dept: LAB | Age: 49
End: 2019-12-30
Attending: FAMILY MEDICINE
Payer: COMMERCIAL

## 2019-12-30 DIAGNOSIS — R21 RASH AND OTHER NONSPECIFIC SKIN ERUPTION: ICD-10-CM

## 2019-12-30 DIAGNOSIS — N30.00 ACUTE CYSTITIS WITHOUT HEMATURIA: ICD-10-CM

## 2019-12-30 LAB
CRP SERPL-MCNC: 0.43 MG/DL (ref ?–0.3)
RHEUMATOID FACT SERPL-ACNC: 11 IU/ML (ref ?–15)

## 2019-12-30 PROCEDURE — 87086 URINE CULTURE/COLONY COUNT: CPT

## 2019-12-30 PROCEDURE — 86431 RHEUMATOID FACTOR QUANT: CPT

## 2019-12-30 PROCEDURE — 87186 SC STD MICRODIL/AGAR DIL: CPT

## 2019-12-30 PROCEDURE — 36415 COLL VENOUS BLD VENIPUNCTURE: CPT

## 2019-12-30 PROCEDURE — 87088 URINE BACTERIA CULTURE: CPT

## 2019-12-30 PROCEDURE — 86140 C-REACTIVE PROTEIN: CPT

## 2019-12-30 PROCEDURE — 86038 ANTINUCLEAR ANTIBODIES: CPT

## 2020-01-02 RX ORDER — CIPROFLOXACIN 250 MG/1
250 TABLET, FILM COATED ORAL 2 TIMES DAILY
Qty: 10 TABLET | Refills: 0 | Status: SHIPPED | OUTPATIENT
Start: 2020-01-02 | End: 2020-01-07

## 2020-01-03 LAB — ANA SCREEN: NEGATIVE

## 2020-01-03 NOTE — PROGRESS NOTES
Discussed test results with Natasha Love by phone telling her she has a UTI and an antibiotic has been prescribed by [facility]. Natasha Love verbalized understanding.  She does have an appointment with Chanel Lutz 2/4/2020

## 2020-02-04 ENCOUNTER — OFFICE VISIT (OUTPATIENT)
Dept: UROLOGY | Facility: HOSPITAL | Age: 50
End: 2020-02-04
Attending: OBSTETRICS & GYNECOLOGY
Payer: COMMERCIAL

## 2020-02-04 VITALS
BODY MASS INDEX: 22.9 KG/M2 | WEIGHT: 160 LBS | SYSTOLIC BLOOD PRESSURE: 110 MMHG | HEIGHT: 70 IN | DIASTOLIC BLOOD PRESSURE: 70 MMHG

## 2020-02-04 DIAGNOSIS — N95.2 POSTMENOPAUSAL ATROPHIC VAGINITIS: ICD-10-CM

## 2020-02-04 DIAGNOSIS — K59.00 CONSTIPATION, UNSPECIFIED CONSTIPATION TYPE: ICD-10-CM

## 2020-02-04 DIAGNOSIS — N39.0 FREQUENT UTI: Primary | ICD-10-CM

## 2020-02-04 DIAGNOSIS — N81.84 PELVIC MUSCLE WASTING: ICD-10-CM

## 2020-02-04 LAB
BILIRUB UR QL STRIP.AUTO: NEGATIVE
BLOOD URINE: NEGATIVE
CONTROL RUN WITHIN 24 HOURS?: YES
GLUCOSE UR STRIP.AUTO-MCNC: NEGATIVE MG/DL
NITRITE UR QL STRIP.AUTO: POSITIVE
NITRITE URINE: POSITIVE
PH UR STRIP.AUTO: 5 [PH] (ref 4.5–8)
PROT UR STRIP.AUTO-MCNC: NEGATIVE MG/DL
RBC UR QL AUTO: NEGATIVE
SP GR UR STRIP.AUTO: 1.02 (ref 1–1.03)
UROBILINOGEN UR STRIP.AUTO-MCNC: <2 MG/DL

## 2020-02-04 PROCEDURE — 81001 URINALYSIS AUTO W/SCOPE: CPT | Performed by: OBSTETRICS & GYNECOLOGY

## 2020-02-04 PROCEDURE — 87086 URINE CULTURE/COLONY COUNT: CPT | Performed by: OBSTETRICS & GYNECOLOGY

## 2020-02-04 PROCEDURE — 87088 URINE BACTERIA CULTURE: CPT | Performed by: OBSTETRICS & GYNECOLOGY

## 2020-02-04 PROCEDURE — 99212 OFFICE O/P EST SF 10 MIN: CPT

## 2020-02-04 PROCEDURE — 87186 SC STD MICRODIL/AGAR DIL: CPT | Performed by: OBSTETRICS & GYNECOLOGY

## 2020-02-04 RX ORDER — SULFAMETHOXAZOLE AND TRIMETHOPRIM 400; 80 MG/1; MG/1
1 TABLET ORAL NIGHTLY
Qty: 90 TABLET | Refills: 3 | Status: SHIPPED | OUTPATIENT
Start: 2020-02-04 | End: 2020-03-26

## 2020-02-04 RX ORDER — ESTRADIOL 0.1 MG/G
CREAM VAGINAL
Qty: 1 TUBE | Refills: 3 | Status: SHIPPED | OUTPATIENT
Start: 2020-02-04 | End: 2020-03-26

## 2020-02-04 NOTE — PROGRESS NOTES
Kylah Javier DO  2/4/2020     Referred by Rebeca Oliva/Donnie  Pt here with self    Patient presents with:  Bladder Infection: Referred by Christoph Allen, 3 infections E.coli 7/29/19,11/18/19,12/30/19.  Notices an odor, no other symptoms    Several UTIs in by mouth., Disp: , Rfl:   Magnesium 500 MG Oral Tab, Take by mouth nightly., Disp: , Rfl:   Calcium Citrate 950 MG Oral Tab, Take 1 tablet by mouth daily. , Disp: , Rfl:   Vitamin C (VITAMIN C) 500 MG Oral Tab, Take 500 mg by mouth daily. , Disp: , Rfl:   Mu no lesions  LUNGS:  Normal effort  HEART:  RRR  ABDOMEN: soft, no mass, no hernia  EXTREM:  Normal, no edema  SKIN:  Normal, no lesions    PELVIC EXAM:  Ext.  Gen: no atrophy, no lesions  Urethra: no atrophy, nontender  Bladder:some fullness, nontender  Vag

## 2020-02-04 NOTE — PATIENT INSTRUCTIONS
[facility]   [facility]    BOWEL REGIMEN    Constipation can have detrimental effects on bladder function and can worsen the symptoms of prolapse. It is important to avoid constipation.     The first step for treating co 3.  Locate the vaginal opening (See figure 2). Immediately above the vagina is the urethra (a small opening where urine is eliminated from your body).   The urethra may not be as easily identified as the vagina because the opening is much smaller, however,

## 2020-02-07 ENCOUNTER — TELEPHONE (OUTPATIENT)
Dept: UROLOGY | Facility: HOSPITAL | Age: 50
End: 2020-02-07

## 2020-02-07 RX ORDER — SULFAMETHOXAZOLE AND TRIMETHOPRIM 800; 160 MG/1; MG/1
1 TABLET ORAL 2 TIMES DAILY
Qty: 14 TABLET | Refills: 0 | Status: SHIPPED | OUTPATIENT
Start: 2020-02-07 | End: 2020-03-26

## 2020-02-07 NOTE — TELEPHONE ENCOUNTER
LM for pt on VM. Informed of ucx results. TORB Dr Merari Fritz Bactrim DS BOD po X 7days. Pt should stop post coital SS bactrim while taking the DS dose. Resume SS after finishing 7days of DS. Remind pt about hygiene during and after intercourse.  Offered to a

## 2020-02-19 NOTE — TELEPHONE ENCOUNTER
Refill request for:    Requested Prescriptions     Pending Prescriptions Disp Refills   • ALPRAZOLAM 0.5 MG Oral Tab [Pharmacy Med Name: ALPRAZOLAM 0.5 MG TABLET] 30 tablet 3     Sig: TAKE 1 TABLET BY MOUTH 3 TIMES A DAY AS NEEDED FOR ANXIETY        Last P

## 2020-02-21 ENCOUNTER — TELEPHONE (OUTPATIENT)
Dept: UROLOGY | Facility: HOSPITAL | Age: 50
End: 2020-02-21

## 2020-02-21 DIAGNOSIS — R30.0 DYSURIA: Primary | ICD-10-CM

## 2020-02-21 PROBLEM — J02.0 PHARYNGITIS DUE TO GROUP A BETA HEMOLYTIC STREPTOCOCCI: Status: ACTIVE | Noted: 2017-07-09

## 2020-02-21 RX ORDER — DILTIAZEM HYDROCHLORIDE 120 MG/1
120 TABLET, FILM COATED ORAL DAILY
COMMUNITY

## 2020-02-21 RX ORDER — DIPHENHYDRAMINE HYDROCHLORIDE 25 MG/1
5 TABLET ORAL DAILY
COMMUNITY

## 2020-02-21 RX ORDER — ESTRADIOL 0.1 MG/G
0.5 CREAM VAGINAL
COMMUNITY

## 2020-02-21 RX ORDER — ASCORBIC ACID 500 MG
500 TABLET ORAL DAILY
COMMUNITY

## 2020-02-21 RX ORDER — FOLIC ACID 0.8 MG
500 TABLET ORAL DAILY
COMMUNITY

## 2020-02-21 NOTE — TELEPHONE ENCOUNTER
Pt calling saying she is still having uti sx after finishing bactrim from 2/7. Pt also taking bactrim ss for suppression. LM for pt to have ucx done. Call if sx worsen.

## 2020-02-24 NOTE — TELEPHONE ENCOUNTER
Called patient to schedule appointment and if still taking?  Left message on voicemail, sending 3 tries letter via [address] [place] [address] [address]

## 2020-02-25 ENCOUNTER — HOSPITAL ENCOUNTER (OUTPATIENT)
Age: 50
Discharge: HOME | End: 2020-02-25
Attending: INTERNAL MEDICINE | Admitting: INTERNAL MEDICINE

## 2020-02-25 VITALS
TEMPERATURE: 99 F | SYSTOLIC BLOOD PRESSURE: 137 MMHG | RESPIRATION RATE: 16 BRPM | HEART RATE: 101 BPM | OXYGEN SATURATION: 98 % | BODY MASS INDEX: 24.52 KG/M2 | WEIGHT: 165.57 LBS | HEIGHT: 69 IN | DIASTOLIC BLOOD PRESSURE: 72 MMHG

## 2020-02-25 DIAGNOSIS — I47.10 SVT (SUPRAVENTRICULAR TACHYCARDIA): ICD-10-CM

## 2020-02-25 LAB
ANION GAP SERPL CALC-SCNC: 11 MMOL/L (ref 10–20)
ATRIAL RATE (BPM): 90
BASOPHILS # BLD AUTO: 0.1 K/MCL (ref 0–0.3)
BASOPHILS NFR BLD AUTO: 1 %
BUN SERPL-MCNC: 8 MG/DL (ref 6–20)
BUN/CREAT SERPL: 11 (ref 7–25)
CALCIUM SERPL-MCNC: 9.1 MG/DL (ref 8.4–10.2)
CHLORIDE SERPL-SCNC: 104 MMOL/L (ref 98–107)
CO2 SERPL-SCNC: 22 MMOL/L (ref 21–32)
CREAT SERPL-MCNC: 0.74 MG/DL (ref 0.51–0.95)
DIFFERENTIAL METHOD BLD: ABNORMAL
EOSINOPHIL # BLD AUTO: 0 K/MCL (ref 0.1–0.5)
EOSINOPHIL NFR SPEC: 0 %
ERYTHROCYTE [DISTWIDTH] IN BLOOD: 11.9 % (ref 11–15)
GLUCOSE SERPL-MCNC: 121 MG/DL (ref 65–99)
HCG SERPL QL: NEGATIVE
HCT VFR BLD CALC: 39.3 % (ref 36–46.5)
HGB BLD-MCNC: 13.2 G/DL (ref 12–15.5)
IMM GRANULOCYTES # BLD AUTO: 0 K/MCL (ref 0–0.2)
IMM GRANULOCYTES NFR BLD: 1 %
LYMPHOCYTES # BLD MANUAL: 0.4 K/MCL (ref 1–4)
LYMPHOCYTES NFR BLD MANUAL: 5 %
MCH RBC QN AUTO: 33.2 PG (ref 26–34)
MCHC RBC AUTO-ENTMCNC: 33.6 G/DL (ref 32–36.5)
MCV RBC AUTO: 98.7 FL (ref 78–100)
MONOCYTES # BLD MANUAL: 0.8 K/MCL (ref 0.3–0.9)
MONOCYTES NFR BLD MANUAL: 10 %
NEUTROPHILS # BLD: 6.2 K/MCL (ref 1.8–7.7)
NEUTROPHILS NFR BLD AUTO: 83 %
NRBC BLD MANUAL-RTO: 0 /100 WBC
P AXIS (DEGREES): 31
PLATELET # BLD: 271 K/MCL (ref 140–450)
POTASSIUM SERPL-SCNC: 4.6 MMOL/L (ref 3.4–5.1)
PR-INTERVAL (MSEC): 128
QRS-INTERVAL (MSEC): 78
QT-INTERVAL (MSEC): 326
QTC: 399
R AXIS (DEGREES): 57
RBC # BLD: 3.98 MIL/MCL (ref 4–5.2)
REPORT TEXT: NORMAL
SODIUM SERPL-SCNC: 132 MMOL/L (ref 135–145)
T AXIS (DEGREES): 57
VENTRICULAR RATE EKG/MIN (BPM): 90
WBC # BLD: 7.5 K/MCL (ref 4.2–11)

## 2020-02-25 PROCEDURE — 93005 ELECTROCARDIOGRAM TRACING: CPT | Performed by: INTERNAL MEDICINE

## 2020-02-25 PROCEDURE — 93653 COMPRE EP EVAL TX SVT: CPT | Performed by: INTERNAL MEDICINE

## 2020-02-25 PROCEDURE — C1766 INTRO/SHEATH,STRBLE,NON-PEEL: HCPCS | Performed by: INTERNAL MEDICINE

## 2020-02-25 PROCEDURE — 93613 INTRACARDIAC EPHYS 3D MAPG: CPT | Performed by: INTERNAL MEDICINE

## 2020-02-25 PROCEDURE — 10004651 HB RX, NO CHARGE ITEM: Performed by: INTERNAL MEDICINE

## 2020-02-25 PROCEDURE — 10002807 HB RX 258

## 2020-02-25 PROCEDURE — 84703 CHORIONIC GONADOTROPIN ASSAY: CPT

## 2020-02-25 PROCEDURE — 99153 MOD SED SAME PHYS/QHP EA: CPT | Performed by: INTERNAL MEDICINE

## 2020-02-25 PROCEDURE — 10002800 HB RX 250 W HCPCS: Performed by: INTERNAL MEDICINE

## 2020-02-25 PROCEDURE — C1894 INTRO/SHEATH, NON-LASER: HCPCS | Performed by: INTERNAL MEDICINE

## 2020-02-25 PROCEDURE — 99152 MOD SED SAME PHYS/QHP 5/>YRS: CPT | Performed by: INTERNAL MEDICINE

## 2020-02-25 PROCEDURE — 80048 BASIC METABOLIC PNL TOTAL CA: CPT

## 2020-02-25 PROCEDURE — C1733 CATH, EP, OTHR THAN COOL-TIP: HCPCS | Performed by: INTERNAL MEDICINE

## 2020-02-25 PROCEDURE — 10002801 HB RX 250 W/O HCPCS: Performed by: INTERNAL MEDICINE

## 2020-02-25 PROCEDURE — 10002807 HB RX 258: Performed by: INTERNAL MEDICINE

## 2020-02-25 PROCEDURE — 36415 COLL VENOUS BLD VENIPUNCTURE: CPT

## 2020-02-25 PROCEDURE — C1730 CATH, EP, 19 OR FEW ELECT: HCPCS | Performed by: INTERNAL MEDICINE

## 2020-02-25 PROCEDURE — 10006023 HB SUPPLY 272: Performed by: INTERNAL MEDICINE

## 2020-02-25 PROCEDURE — 13000001 HB PHASE II RECOVERY EA 30 MINUTES: Performed by: INTERNAL MEDICINE

## 2020-02-25 PROCEDURE — 85025 COMPLETE CBC W/AUTO DIFF WBC: CPT

## 2020-02-25 PROCEDURE — 93623 PRGRMD STIMJ&PACG IV RX NFS: CPT | Performed by: INTERNAL MEDICINE

## 2020-02-25 RX ORDER — SODIUM CHLORIDE 9 MG/ML
INJECTION, SOLUTION INTRAVENOUS CONTINUOUS
Status: DISCONTINUED | OUTPATIENT
Start: 2020-02-25 | End: 2020-02-26 | Stop reason: HOSPADM

## 2020-02-25 RX ORDER — ONDANSETRON 2 MG/ML
4 INJECTION INTRAMUSCULAR; INTRAVENOUS EVERY 12 HOURS PRN
Status: DISCONTINUED | OUTPATIENT
Start: 2020-02-25 | End: 2020-02-26 | Stop reason: HOSPADM

## 2020-02-25 RX ORDER — ACETAMINOPHEN 325 MG/1
650 TABLET ORAL EVERY 4 HOURS PRN
Status: DISCONTINUED | OUTPATIENT
Start: 2020-02-25 | End: 2020-02-26 | Stop reason: HOSPADM

## 2020-02-25 RX ORDER — SODIUM CHLORIDE 9 MG/ML
INJECTION, SOLUTION INTRAVENOUS
Status: COMPLETED
Start: 2020-02-25 | End: 2020-02-25

## 2020-02-25 RX ORDER — MIDAZOLAM HYDROCHLORIDE 1 MG/ML
INJECTION, SOLUTION INTRAMUSCULAR; INTRAVENOUS PRN
Status: DISCONTINUED | OUTPATIENT
Start: 2020-02-25 | End: 2020-02-26 | Stop reason: HOSPADM

## 2020-02-25 RX ORDER — LIDOCAINE HYDROCHLORIDE 20 MG/ML
INJECTION, SOLUTION EPIDURAL; INFILTRATION; INTRACAUDAL; PERINEURAL PRN
Status: DISCONTINUED | OUTPATIENT
Start: 2020-02-25 | End: 2020-02-26 | Stop reason: HOSPADM

## 2020-02-25 RX ORDER — ONDANSETRON 4 MG/1
4 TABLET, ORALLY DISINTEGRATING ORAL EVERY 12 HOURS PRN
Status: DISCONTINUED | OUTPATIENT
Start: 2020-02-25 | End: 2020-02-26 | Stop reason: HOSPADM

## 2020-02-25 RX ORDER — 0.9 % SODIUM CHLORIDE 0.9 %
2 VIAL (ML) INJECTION EVERY 12 HOURS SCHEDULED
Status: DISCONTINUED | OUTPATIENT
Start: 2020-02-25 | End: 2020-02-26 | Stop reason: HOSPADM

## 2020-02-25 RX ORDER — HYDROCODONE BITARTRATE AND ACETAMINOPHEN 5; 325 MG/1; MG/1
1 TABLET ORAL EVERY 4 HOURS PRN
Status: DISCONTINUED | OUTPATIENT
Start: 2020-02-25 | End: 2020-02-26 | Stop reason: HOSPADM

## 2020-02-25 RX ADMIN — SODIUM CHLORIDE 20 ML: 9 INJECTION, SOLUTION INTRAMUSCULAR; INTRAVENOUS; SUBCUTANEOUS at 10:43

## 2020-02-25 RX ADMIN — SODIUM CHLORIDE: 9 INJECTION, SOLUTION INTRAVENOUS at 10:35

## 2020-02-25 ASSESSMENT — PAIN SCALES - GENERAL
PAINLEVEL_OUTOF10: 0

## 2020-02-26 ENCOUNTER — OFFICE VISIT (OUTPATIENT)
Dept: FAMILY MEDICINE CLINIC | Facility: CLINIC | Age: 50
End: 2020-02-26
Payer: COMMERCIAL

## 2020-02-26 ENCOUNTER — APPOINTMENT (OUTPATIENT)
Dept: LAB | Age: 50
End: 2020-02-26
Attending: OBSTETRICS & GYNECOLOGY
Payer: COMMERCIAL

## 2020-02-26 VITALS
HEIGHT: 69 IN | DIASTOLIC BLOOD PRESSURE: 80 MMHG | OXYGEN SATURATION: 94 % | TEMPERATURE: 98 F | RESPIRATION RATE: 18 BRPM | HEART RATE: 74 BPM | BODY MASS INDEX: 24.18 KG/M2 | SYSTOLIC BLOOD PRESSURE: 124 MMHG | WEIGHT: 163.25 LBS

## 2020-02-26 DIAGNOSIS — R30.0 DYSURIA: ICD-10-CM

## 2020-02-26 DIAGNOSIS — R05.9 COUGH: ICD-10-CM

## 2020-02-26 DIAGNOSIS — J02.9 SORE THROAT: Primary | ICD-10-CM

## 2020-02-26 DIAGNOSIS — R09.81 SINUS CONGESTION: ICD-10-CM

## 2020-02-26 PROCEDURE — 99213 OFFICE O/P EST LOW 20 MIN: CPT | Performed by: NURSE PRACTITIONER

## 2020-02-26 PROCEDURE — 87086 URINE CULTURE/COLONY COUNT: CPT

## 2020-02-26 RX ORDER — ALPRAZOLAM 0.5 MG/1
TABLET ORAL
Qty: 30 TABLET | Refills: 1 | Status: SHIPPED | OUTPATIENT
Start: 2020-02-26 | End: 2020-03-26

## 2020-02-26 RX ORDER — AMOXICILLIN AND CLAVULANATE POTASSIUM 875; 125 MG/1; MG/1
1 TABLET, FILM COATED ORAL 2 TIMES DAILY
Qty: 14 TABLET | Refills: 0 | Status: SHIPPED | OUTPATIENT
Start: 2020-02-26 | End: 2020-03-26

## 2020-02-26 NOTE — PROGRESS NOTES
Patient presents with:  Sore Throat: x 3 days, sore throat hurts to swallow      HPI:  Presents with 3 day history of sore throat, cough with production of sputum, sinus congestion, nasal drainage and fatigue.  Denies fever/chills, facial pain, ear pain/pre daily.     • Vitamin C (VITAMIN C) 500 MG Oral Tab Take 500 mg by mouth daily. • Multiple Vitamins-Minerals (B COMPLEX VITAMINS PLUS) Oral Tab Take 1 tablet by mouth daily.      • Cholecalciferol (VITAMIN D) 1000 UNITS Oral Tab Take 1 tablet by mouth da supportive care-increased fluids/rest. Instructed to notify office if not improved in 3-4 days or if symptoms worsen. Verbalized understanding of instructions and agreeable to this plan of care.      No orders of the defined types were placed in this encoun

## 2020-02-26 NOTE — PATIENT INSTRUCTIONS
Gargle with warm salt water solution 3-5 times daily. Dissolve 1/2 teaspoon salt in half cup of warm tap water. Gargle and spit.      Try a premixed saline nasal spray, available over the counter, such as [place] Nasal Spray (or generic equi

## 2020-02-28 ENCOUNTER — TELEPHONE (OUTPATIENT)
Dept: UROLOGY | Facility: HOSPITAL | Age: 50
End: 2020-02-28

## 2020-02-28 NOTE — TELEPHONE ENCOUNTER
.Phoned patient, Jackie Larkin, and informed of normal urine culture results. Instructed pt to call if UTI sx persist or worsen.   Continue with plan for post coital Bactrim SS.

## 2020-03-10 ENCOUNTER — HOSPITAL ENCOUNTER (OUTPATIENT)
Dept: MAMMOGRAPHY | Facility: HOSPITAL | Age: 50
Discharge: HOME | End: 2020-03-10
Attending: SURGERY
Payer: COMMERCIAL

## 2020-03-10 DIAGNOSIS — N60.89 FLAT EPITHELIAL ATYPIA (FEA) OF BREAST, UNSPECIFIED LATERALITY: ICD-10-CM

## 2020-03-12 ENCOUNTER — HOSPITAL ENCOUNTER (OUTPATIENT)
Dept: MAMMOGRAPHY | Facility: HOSPITAL | Age: 50
Discharge: HOME | End: 2020-03-12
Attending: SURGERY
Payer: COMMERCIAL

## 2020-03-12 PROCEDURE — 77062 BREAST TOMOSYNTHESIS BI: CPT | Performed by: SURGERY

## 2020-03-12 PROCEDURE — 77066 DX MAMMO INCL CAD BI: CPT | Performed by: SURGERY

## 2020-04-02 RX ORDER — ZOLPIDEM TARTRATE 5 MG/1
TABLET ORAL
Qty: 90 TABLET | Refills: 1 | OUTPATIENT
Start: 2020-04-02

## 2020-04-02 NOTE — TELEPHONE ENCOUNTER
Refill request for:    Requested Prescriptions     Pending Prescriptions Disp Refills   • ZOLPIDEM 5 MG Oral Tab [Pharmacy Med Name: ZOLPIDEM TARTRATE 5 MG TABLET] 90 tablet 1     Sig: TAKE 1 TABLET BY MOUTH NIGHTLY AS NEEDED FOR SLEEP        Last Prescrib

## 2020-05-01 NOTE — TELEPHONE ENCOUNTER
Refill request for:    Requested Prescriptions     Pending Prescriptions Disp Refills   • ALPRAZOLAM 0.5 MG Oral Tab [Pharmacy Med Name: ALPRAZOLAM 0.5 MG TABLET] 30 tablet 1     Sig: TAKE 1 TABLET BY MOUTH THREE TIMES A DAY AS NEEDED FOR ANXIETY        [place]

## 2020-05-04 ENCOUNTER — TELEPHONE (OUTPATIENT)
Dept: FAMILY MEDICINE CLINIC | Facility: CLINIC | Age: 50
End: 2020-05-04

## 2020-05-04 RX ORDER — ALPRAZOLAM 0.5 MG/1
0.5 TABLET ORAL 3 TIMES DAILY PRN
Qty: 30 TABLET | Refills: 1 | Status: SHIPPED | OUTPATIENT
Start: 2020-05-04 | End: 2020-07-17

## 2020-05-04 RX ORDER — ZOLPIDEM TARTRATE 5 MG/1
5 TABLET ORAL NIGHTLY PRN
Qty: 90 TABLET | Refills: 1 | Status: CANCELLED | OUTPATIENT
Start: 2020-05-04

## 2020-05-04 RX ORDER — ZOLPIDEM TARTRATE 10 MG/1
10 TABLET ORAL NIGHTLY
Refills: 0 | OUTPATIENT
Start: 2020-05-04 | End: 2021-04-29

## 2020-05-04 RX ORDER — ALPRAZOLAM 0.5 MG/1
TABLET ORAL
Qty: 30 TABLET | Refills: 1 | OUTPATIENT
Start: 2020-05-04

## 2020-05-04 RX ORDER — ALPRAZOLAM 0.5 MG/1
0.5 TABLET ORAL 3 TIMES DAILY PRN
COMMUNITY
Start: 2020-04-02 | End: 2020-05-04

## 2020-05-04 NOTE — TELEPHONE ENCOUNTER
Called patient, let her know that a refill was sent for Alprazolam. Advised her that we did need a virtual visit for her Zolpidem as she was looking to change dosage and patient stated that she would just like to keep the same dosage and has enough to last

## 2020-05-04 NOTE — TELEPHONE ENCOUNTER
Patient is requesting refill on ambien 10mg instead of 5mg. More stated she 5mg is not working for her.  Preferred pharmacy [facility]

## 2020-05-04 NOTE — TELEPHONE ENCOUNTER
LOV 11/18/2019 with Amarilis KRAMER last filled 2/26/20 for 30 + 1. She is now out She also needs her zolpidem refilled but wants to increase to 10 mg nightly, as she is out of medication and is up set about this.

## 2020-05-04 NOTE — TELEPHONE ENCOUNTER
Patient is calling regarding her refill request. Patient stated she is now completely out of medication and does not understand why her medication can not be refilled if she was just in the office not too long ago.  Patient is also calling regarding her amb

## 2020-05-28 NOTE — TELEPHONE ENCOUNTER
Refill request for:    Requested Prescriptions     Pending Prescriptions Disp Refills   • ZOLPIDEM 5 MG Oral Tab [Pharmacy Med Name: ZOLPIDEM TARTRATE 5 MG TABLET] 90 tablet 0     Sig: TAKE 1 TABLET BY MOUTH NIGHTLY AS NEEDED FOR SLEEP        Last Prescrib

## 2020-05-28 NOTE — TELEPHONE ENCOUNTER
Patient called checking status of refill for her Ambien (is out of medication) states was told by pharmacy need to contact the office?

## 2020-05-29 RX ORDER — ESZOPICLONE 2 MG/1
2 TABLET, FILM COATED ORAL NIGHTLY
Qty: 30 TABLET | Refills: 0 | Status: SHIPPED | OUTPATIENT
Start: 2020-05-29 | End: 2020-06-30

## 2020-05-29 RX ORDER — ZOLPIDEM TARTRATE 5 MG/1
TABLET ORAL
Qty: 90 TABLET | Refills: 0 | OUTPATIENT
Start: 2020-05-29

## 2020-05-29 NOTE — TELEPHONE ENCOUNTER
Order for eszopiclone sent to [facility] on file for 30 days. Patient to notify office if issues.

## 2020-06-02 ENCOUNTER — OFFICE VISIT (OUTPATIENT)
Dept: FAMILY MEDICINE CLINIC | Facility: CLINIC | Age: 50
End: 2020-06-02
Payer: COMMERCIAL

## 2020-06-02 VITALS
TEMPERATURE: 98 F | HEIGHT: 69 IN | BODY MASS INDEX: 23.31 KG/M2 | WEIGHT: 157.38 LBS | RESPIRATION RATE: 14 BRPM | HEART RATE: 70 BPM | DIASTOLIC BLOOD PRESSURE: 76 MMHG | SYSTOLIC BLOOD PRESSURE: 120 MMHG

## 2020-06-02 DIAGNOSIS — Z72.89 ACUTE ALCOHOL USE: Primary | ICD-10-CM

## 2020-06-02 DIAGNOSIS — F43.9 STRESS: ICD-10-CM

## 2020-06-02 PROCEDURE — 99214 OFFICE O/P EST MOD 30 MIN: CPT | Performed by: NURSE PRACTITIONER

## 2020-06-02 RX ORDER — FLUOXETINE 20 MG/1
20 TABLET, FILM COATED ORAL DAILY
Qty: 30 TABLET | Refills: 1 | Status: SHIPPED | OUTPATIENT
Start: 2020-06-02 | End: 2020-06-30

## 2020-06-02 NOTE — PROGRESS NOTES
Patient presents with:  Medication Request: chay will talk with provider       HPI:  Presents with several week history of feeling she is drinking too much.  Stated for a long time she has had 1-2 glasses of wine per night but lately noted it has become nightly. 30 tablet 0   • ALPRAZolam 0.5 MG Oral Tab Take 1 tablet (0.5 mg total) by mouth 3 (three) times daily as needed for Sleep or Anxiety. 30 tablet 1   • hydrOXYzine HCl 25 MG Oral Tab Take 1 tablet (25 mg total) by mouth every evening.  14 tablet 0 discussed appropriate use of xanax and need for daily anxiety to medication to manage symptoms. Discussed this would also be helpful in smoking cessation.  Discussed medication options, function and potential side effects as well as expected results of medi

## 2020-06-24 RX ORDER — FLUOXETINE 20 MG/1
TABLET, FILM COATED ORAL
Qty: 30 TABLET | Refills: 1 | OUTPATIENT
Start: 2020-06-24

## 2020-06-24 NOTE — TELEPHONE ENCOUNTER
Refill request for:    Requested Prescriptions     Pending Prescriptions Disp Refills   • FLUOXETINE HCL 20 MG Oral Tab [Pharmacy Med Name: FLUOXETINE HCL 20 MG TABLET] 30 tablet 1     Sig: TAKE 1 TABLET BY MOUTH EVERY DAY        Last Prescribed Quantity R

## 2020-06-30 PROBLEM — F41.9 ANXIETY: Status: ACTIVE | Noted: 2020-06-30

## 2020-06-30 PROBLEM — Z12.11 SPECIAL SCREENING FOR MALIGNANT NEOPLASM OF COLON: Status: RESOLVED | Noted: 2019-09-19 | Resolved: 2020-06-30

## 2020-06-30 PROBLEM — G47.9 DIFFICULTY SLEEPING: Status: ACTIVE | Noted: 2020-06-30

## 2020-06-30 NOTE — PROGRESS NOTES
Patient presents with:  Medication Follow-Up: Fluoxetine, pt has been taking 1/2 tablet.   felt a little weird on full dose, felt out of it, and nausea       HPI:  Presents for follow up of stress/anxiety with some increase ETOH use, last seen by me for [employer] [facility] Refill   • Sertraline HCl 25 MG Oral Tab Take 1 tablet (25 mg total) by mouth daily. 30 tablet 1   • Eszopiclone 3 MG Oral Tab Take 3 mg by mouth nightly.  30 tablet 0   • ALPRAZolam 0.5 MG Oral Tab Take 1 tablet (0.5 mg total) by mouth 3 (three) t sertraline from fluoxetine given reported sexual side effects. See me in 1 month for follow up of this medication change, sooner of worsening. Continue to see [facility] therapist as well.  Verbalized understanding of instructions and agreeable to this plan of car

## 2020-07-13 ENCOUNTER — TELEPHONE (OUTPATIENT)
Dept: FAMILY MEDICINE CLINIC | Facility: CLINIC | Age: 50
End: 2020-07-13

## 2020-07-13 RX ORDER — BUPROPION HYDROCHLORIDE 150 MG/1
150 TABLET ORAL DAILY
Qty: 30 TABLET | Refills: 1 | Status: SHIPPED | OUTPATIENT
Start: 2020-07-13 | End: 2020-07-27

## 2020-07-13 NOTE — TELEPHONE ENCOUNTER
Sent to pharmacy on file. Should stop Sertraline and start bupropion as ordered. See me in 2-3 weeks for follow up. Thanks.

## 2020-07-13 NOTE — TELEPHONE ENCOUNTER
Patient is calling requesting to speak to nurse regarding a \"change of medication\" pt stated she was just seen by Noe Tran and wants to speak to nurse.

## 2020-07-13 NOTE — TELEPHONE ENCOUNTER
Patient calling with a f/u to 6/30 visit with HERI Schultz. She states the sertraline is giving her the same side effects as the previous medications she has tried (she would not elaborate). Patient wanting to try Wellbutrin XL.      Routed to Noe Tran

## 2020-07-17 RX ORDER — ALPRAZOLAM 0.5 MG/1
0.5 TABLET ORAL 3 TIMES DAILY PRN
Qty: 30 TABLET | Refills: 1 | Status: SHIPPED | OUTPATIENT
Start: 2020-07-17 | End: 2020-10-26

## 2020-07-17 NOTE — TELEPHONE ENCOUNTER
Refill request for:    Requested Prescriptions     Pending Prescriptions Disp Refills   • ALPRAZOLAM 0.5 MG Oral Tab [Pharmacy Med Name: ALPRAZOLAM 0.5 MG TABLET] 30 tablet 1     Sig: TAKE 1 TABLET (0.5 MG TOTAL) BY MOUTH 3 (THREE) TIMES DAILY AS NEEDED FO

## 2020-07-27 NOTE — PROGRESS NOTES
Patient presents with:  Medication Follow-Up: medication follow, pt stated that seems to be working       HPI:  Present for follow up of stress/anxiety and difficulty sleeping. Also has had some recent issues with increased alcohol use.  At last visit on 6/ Tab Take 1 tablet by mouth once daily. 84 tablet 3   • Biotin 5000 MCG Oral Cap Take 1 tablet by mouth. • Magnesium 500 MG Oral Tab Take by mouth nightly. • Calcium Citrate 950 MG Oral Tab Take 1 tablet by mouth daily.      • Vitamin C (VITAMIN C) 5 visit. All questions were answered and the patient understands the plan.

## 2020-08-11 RX ORDER — SERTRALINE HYDROCHLORIDE 25 MG/1
TABLET, FILM COATED ORAL
Qty: 30 TABLET | Refills: 1 | OUTPATIENT
Start: 2020-08-11

## 2020-08-11 RX ORDER — FLUOXETINE 20 MG/1
TABLET, FILM COATED ORAL
Qty: 30 TABLET | Refills: 1 | OUTPATIENT
Start: 2020-08-11

## 2020-08-11 NOTE — TELEPHONE ENCOUNTER
Refill request for:    Requested Prescriptions     Pending Prescriptions Disp Refills   • FLUOXETINE HCL 20 MG Oral Tab [Pharmacy Med Name: FLUOXETINE HCL 20 MG TABLET] 30 tablet 1     Sig: TAKE 1 TABLET BY MOUTH EVERY DAY   • SERTRALINE HCL 25 MG Oral Tab

## 2020-09-14 RX ORDER — NORETHINDRONE ACETATE AND ETHINYL ESTRADIOL AND FERROUS FUMARATE 1MG-20(21)
KIT ORAL
Qty: 84 TABLET | Refills: 0 | Status: SHIPPED | OUTPATIENT
Start: 2020-09-14 | End: 2020-11-27

## 2020-09-16 ENCOUNTER — OFFICE VISIT (OUTPATIENT)
Dept: FAMILY MEDICINE CLINIC | Facility: CLINIC | Age: 50
End: 2020-09-16
Payer: COMMERCIAL

## 2020-09-16 ENCOUNTER — TELEPHONE (OUTPATIENT)
Dept: FAMILY MEDICINE CLINIC | Facility: CLINIC | Age: 50
End: 2020-09-16

## 2020-09-16 VITALS
TEMPERATURE: 98 F | SYSTOLIC BLOOD PRESSURE: 118 MMHG | HEART RATE: 82 BPM | DIASTOLIC BLOOD PRESSURE: 80 MMHG | RESPIRATION RATE: 16 BRPM

## 2020-09-16 DIAGNOSIS — F41.9 ANXIETY: ICD-10-CM

## 2020-09-16 DIAGNOSIS — R30.0 DYSURIA: Primary | ICD-10-CM

## 2020-09-16 LAB
BILIRUBIN: NEGATIVE
GLUCOSE (URINE DIPSTICK): NEGATIVE MG/DL
KETONES (URINE DIPSTICK): NEGATIVE MG/DL
MULTISTIX LOT#: 1044 NUMERIC
NITRITE, URINE: NEGATIVE
OCCULT BLOOD: NEGATIVE
PH, URINE: 6.5 (ref 4.5–8)
PROTEIN (URINE DIPSTICK): 30 MG/DL
SPECIFIC GRAVITY: 1.02 (ref 1–1.03)
UROBILINOGEN,SEMI-QN: 0.2 MG/DL (ref 0–1.9)

## 2020-09-16 PROCEDURE — 87086 URINE CULTURE/COLONY COUNT: CPT | Performed by: NURSE PRACTITIONER

## 2020-09-16 PROCEDURE — 3079F DIAST BP 80-89 MM HG: CPT | Performed by: NURSE PRACTITIONER

## 2020-09-16 PROCEDURE — 99213 OFFICE O/P EST LOW 20 MIN: CPT | Performed by: NURSE PRACTITIONER

## 2020-09-16 PROCEDURE — 87088 URINE BACTERIA CULTURE: CPT | Performed by: NURSE PRACTITIONER

## 2020-09-16 PROCEDURE — 81003 URINALYSIS AUTO W/O SCOPE: CPT | Performed by: NURSE PRACTITIONER

## 2020-09-16 PROCEDURE — 3074F SYST BP LT 130 MM HG: CPT | Performed by: NURSE PRACTITIONER

## 2020-09-16 PROCEDURE — 87186 SC STD MICRODIL/AGAR DIL: CPT | Performed by: NURSE PRACTITIONER

## 2020-09-16 RX ORDER — CIPROFLOXACIN 500 MG/1
500 TABLET, FILM COATED ORAL 2 TIMES DAILY
Qty: 10 TABLET | Refills: 0 | Status: SHIPPED | OUTPATIENT
Start: 2020-09-16 | End: 2021-02-19 | Stop reason: ALTCHOICE

## 2020-09-16 RX ORDER — BUPROPION HYDROCHLORIDE 300 MG/1
300 TABLET ORAL DAILY
Qty: 90 TABLET | Refills: 0 | Status: SHIPPED | OUTPATIENT
Start: 2020-09-16 | End: 2020-12-29

## 2020-09-16 RX ORDER — SULFAMETHOXAZOLE AND TRIMETHOPRIM 400; 80 MG/1; MG/1
1 TABLET ORAL NIGHTLY
COMMUNITY
Start: 2020-07-28 | End: 2021-02-19 | Stop reason: ALTCHOICE

## 2020-09-16 NOTE — TELEPHONE ENCOUNTER
Pt states she has a UTI and requesting medication.  An appt was offered with Tori Amezcua for today but she wants to talk to a nurse first

## 2020-09-16 NOTE — PROGRESS NOTES
Patient presents with:  Urinary: burning, cloudy and has a smell       HPI:  Presents with 2 day history of dysuria, foul odor to urine, cloudy appearing urine. Denies fever/chills, urgency, frequency, hematuria, abd pain, N/V/D, flank/back pain.  Hs had on 5000 MCG Oral Cap Take 1 tablet by mouth. • Magnesium 500 MG Oral Tab Take by mouth nightly. • Calcium Citrate 950 MG Oral Tab Take 1 tablet by mouth daily. • Vitamin C (VITAMIN C) 500 MG Oral Tab Take 500 mg by mouth daily.      • Multiple Maribel Signed Prescriptions Disp Refills   • Ciprofloxacin HCl 500 MG Oral Tab 10 tablet 0     Sig: Take 1 tablet (500 mg total) by mouth 2 (two) times daily.    • buPROPion HCl ER, XL, 300 MG Oral Tablet 24 Hr 90 tablet 0     Sig: Take 1 tablet (300 mg total)

## 2020-09-21 DIAGNOSIS — R30.0 DYSURIA: Primary | ICD-10-CM

## 2020-09-24 ENCOUNTER — TELEPHONE (OUTPATIENT)
Dept: FAMILY MEDICINE CLINIC | Facility: CLINIC | Age: 50
End: 2020-09-24

## 2020-09-24 NOTE — TELEPHONE ENCOUNTER
LOV 9/16/2020.    Future Appointments   Date Time Provider Naseem Collazo   10/1/2020 10:00 AM Divya Cunningham [address]   10/8/2020 10:00 AM Christine Osorio LCSW [facility] ERLIN Flores   10/15/2020 10:00 AM Solitario Lynch

## 2020-09-24 NOTE — TELEPHONE ENCOUNTER
Pt requesting to change med Lunesta back to Ambien as med does not work and leaves bad taste in her mouth.  [facility]

## 2020-09-24 NOTE — TELEPHONE ENCOUNTER
No need for appointment, I just saw her but the reason we switched was the Ambien was on back order. Is this medication now available?

## 2020-09-24 NOTE — TELEPHONE ENCOUNTER
Pt states that Ambien is in stock now at her [facility].She does not like how she feels on Lunesta. Pended order for Ambien.     Routed to [employer]

## 2020-09-25 RX ORDER — ZOLPIDEM TARTRATE 5 MG/1
5 TABLET ORAL NIGHTLY PRN
Qty: 90 TABLET | Refills: 0 | Status: SHIPPED | OUTPATIENT
Start: 2020-09-25 | End: 2021-02-19 | Stop reason: ALTCHOICE

## 2020-10-26 RX ORDER — ALPRAZOLAM 0.5 MG/1
0.5 TABLET ORAL 3 TIMES DAILY PRN
Qty: 30 TABLET | Refills: 1 | Status: SHIPPED | OUTPATIENT
Start: 2020-10-26 | End: 2020-12-29

## 2020-11-09 ENCOUNTER — TELEPHONE (OUTPATIENT)
Dept: FAMILY MEDICINE CLINIC | Facility: CLINIC | Age: 50
End: 2020-11-09

## 2020-11-09 DIAGNOSIS — Z72.89 ACUTE ALCOHOL USE: Primary | ICD-10-CM

## 2020-11-09 NOTE — TELEPHONE ENCOUNTER
I do not recall discussing this. Also, I do not manage naltrexone, so I cannot imagine in any discussion that I would have told her I will do it. I looked at my last several visits and see no mention of it.  Please let her know this medication typically nee

## 2020-11-09 NOTE — TELEPHONE ENCOUNTER
Patient requesting Naltrexone, states she has discussed with Jeffry Mcgrath.  [facility]

## 2020-11-12 ENCOUNTER — TELEPHONE (OUTPATIENT)
Dept: FAMILY MEDICINE CLINIC | Facility: CLINIC | Age: 50
End: 2020-11-12

## 2020-11-12 DIAGNOSIS — Z12.31 VISIT FOR SCREENING MAMMOGRAM: ICD-10-CM

## 2020-11-12 DIAGNOSIS — Z00.00 LABORATORY EXAMINATION ORDERED AS PART OF A ROUTINE GENERAL MEDICAL EXAMINATION: ICD-10-CM

## 2020-11-12 NOTE — TELEPHONE ENCOUNTER
Patient has 11/25/2020 physical scheduled with Amarilis Lal PA-C. Patient is requesting lab orders to [address].     Routed to [employer]

## 2020-11-20 ENCOUNTER — LAB ENCOUNTER (OUTPATIENT)
Dept: LAB | Age: 50
End: 2020-11-20
Attending: FAMILY MEDICINE
Payer: COMMERCIAL

## 2020-11-20 DIAGNOSIS — Z00.00 LABORATORY EXAMINATION ORDERED AS PART OF A ROUTINE GENERAL MEDICAL EXAMINATION: ICD-10-CM

## 2020-11-20 PROCEDURE — 84443 ASSAY THYROID STIM HORMONE: CPT

## 2020-11-20 PROCEDURE — 80053 COMPREHEN METABOLIC PANEL: CPT

## 2020-11-20 PROCEDURE — 80061 LIPID PANEL: CPT

## 2020-11-20 PROCEDURE — 36415 COLL VENOUS BLD VENIPUNCTURE: CPT

## 2020-11-20 PROCEDURE — 85025 COMPLETE CBC W/AUTO DIFF WBC: CPT

## 2020-11-26 ENCOUNTER — TELEPHONE (OUTPATIENT)
Dept: FAMILY MEDICINE CLINIC | Facility: CLINIC | Age: 50
End: 2020-11-26

## 2020-11-27 RX ORDER — NORETHINDRONE ACETATE AND ETHINYL ESTRADIOL AND FERROUS FUMARATE 1MG-20(21)
KIT ORAL
Qty: 84 TABLET | Refills: 0 | Status: SHIPPED | OUTPATIENT
Start: 2020-11-27 | End: 2021-02-01

## 2020-11-27 NOTE — TELEPHONE ENCOUNTER
Pt is scheduled for Monday at 3:45 pm and is completely out of her medication and is leaving at noon today to go out of town and [facility].

## 2020-11-30 ENCOUNTER — OFFICE VISIT (OUTPATIENT)
Dept: FAMILY MEDICINE CLINIC | Facility: CLINIC | Age: 50
End: 2020-11-30
Payer: COMMERCIAL

## 2020-11-30 ENCOUNTER — TELEPHONE (OUTPATIENT)
Dept: FAMILY MEDICINE CLINIC | Facility: CLINIC | Age: 50
End: 2020-11-30

## 2020-11-30 VITALS
RESPIRATION RATE: 16 BRPM | DIASTOLIC BLOOD PRESSURE: 70 MMHG | BODY MASS INDEX: 23.7 KG/M2 | WEIGHT: 160 LBS | SYSTOLIC BLOOD PRESSURE: 110 MMHG | HEART RATE: 98 BPM | HEIGHT: 69 IN | TEMPERATURE: 97 F

## 2020-11-30 DIAGNOSIS — Z00.00 WELL WOMAN EXAM WITHOUT GYNECOLOGICAL EXAM: Primary | ICD-10-CM

## 2020-11-30 PROCEDURE — 3008F BODY MASS INDEX DOCD: CPT | Performed by: FAMILY MEDICINE

## 2020-11-30 PROCEDURE — 3078F DIAST BP <80 MM HG: CPT | Performed by: FAMILY MEDICINE

## 2020-11-30 PROCEDURE — 3074F SYST BP LT 130 MM HG: CPT | Performed by: FAMILY MEDICINE

## 2020-11-30 PROCEDURE — 99396 PREV VISIT EST AGE 40-64: CPT | Performed by: FAMILY MEDICINE

## 2020-11-30 NOTE — TELEPHONE ENCOUNTER
Patient looking to possibly schedule an appointment with you for this Thursday around 10 am looking for your direction

## 2020-11-30 NOTE — PROGRESS NOTES
SUBJECTIVE:  Patient presents with:  Physical: WWE no pap, Declined Flu Shot     HPI:  On OCPs. Notes that she no longer has periods when she does placebo week. Felt \"Terrible\" this last week when she had placebo. Thinks she could be in menopause.  Wou Past Surgical History:   Procedure Laterality Date   • BREAST BIOPSY NEEDLE LOCALIZATION Right 10/15/2018    Performed by Amara Brown MD at [facility] OR   •   2000   • ADONIS BIOPSY STEREO NODULE 1 SITE RIGHT (BQE=04594) Right 2018 no pedal edema, no clubbing or cyanosis    ASSESSMENT & PLAN:  Guss Kawasaki is a 45year old female is here for Physical (WWE no pap, Declined Flu Shot )    Problem List Items Addressed This Visit     None      Visit Diagnoses     Well woman exam withou

## 2020-12-16 ENCOUNTER — TELEPHONE (OUTPATIENT)
Dept: FAMILY MEDICINE CLINIC | Facility: CLINIC | Age: 50
End: 2020-12-16

## 2020-12-16 NOTE — TELEPHONE ENCOUNTER
Evi's Wellbutrin was increased to 300mg daily at a visit with Nicolette LIRIANO on 9/16/2020 with instructions to f/u in 1-2 months, sooner if needed. Today she is calling saying she is incredibly more irritable and feeling down.   She was recently her 11/3

## 2020-12-16 NOTE — TELEPHONE ENCOUNTER
Appointment rescheduled to 12/17/2020 at 1:30p.m.     NIKHIL  Routed to [facility]

## 2020-12-17 NOTE — PROGRESS NOTES
Virtual/Telephone Check-In    Medhat Paul verbally consents to a Virtual/Telephone Check-In service on 12/17/2020. Patient understands and accepts financial responsibility for any deductible, co-insurance and/or co-pays associated with this service.

## 2020-12-28 ENCOUNTER — TELEPHONE (OUTPATIENT)
Dept: FAMILY MEDICINE CLINIC | Facility: CLINIC | Age: 50
End: 2020-12-28

## 2020-12-28 NOTE — TELEPHONE ENCOUNTER
LOV 11/30/2020. Virtual visit 12/17/2020 and at that time, pt was advised to follow up in 3 weeks ( around 1/7/2021)    Future Appointments     Date Time Provider Naseem Collazo   6/24/2021  9:15 AM Richard Cedeno MD [facility]     Left m

## 2020-12-28 NOTE — TELEPHONE ENCOUNTER
Future Appointments   Date Time Provider Naseem Collazo   12/29/2020  7:40 AM Dominick Caraballo,  [facility] 3 [facility]   6/24/2021  9:15 AM Clifton Peres MD [facility]

## 2020-12-29 ENCOUNTER — TELEPHONE (OUTPATIENT)
Dept: FAMILY MEDICINE CLINIC | Facility: CLINIC | Age: 50
End: 2020-12-29

## 2020-12-29 ENCOUNTER — VIRTUAL PHONE E/M (OUTPATIENT)
Dept: FAMILY MEDICINE CLINIC | Facility: CLINIC | Age: 50
End: 2020-12-29
Payer: COMMERCIAL

## 2020-12-29 DIAGNOSIS — F41.9 ANXIETY: ICD-10-CM

## 2020-12-29 DIAGNOSIS — G47.9 DIFFICULTY SLEEPING: Primary | ICD-10-CM

## 2020-12-29 DIAGNOSIS — F43.9 STRESS: ICD-10-CM

## 2020-12-29 PROCEDURE — 99213 OFFICE O/P EST LOW 20 MIN: CPT | Performed by: FAMILY MEDICINE

## 2020-12-29 RX ORDER — ZOLPIDEM TARTRATE 10 MG/1
10 TABLET ORAL NIGHTLY
Qty: 90 TABLET | Refills: 0 | Status: SHIPPED | OUTPATIENT
Start: 2020-12-29 | End: 2021-04-02

## 2020-12-29 RX ORDER — ZOLPIDEM TARTRATE 5 MG/1
TABLET ORAL
Qty: 90 TABLET | Refills: 0 | OUTPATIENT
Start: 2020-12-29

## 2020-12-29 RX ORDER — BUPROPION HYDROCHLORIDE 150 MG/1
150 TABLET ORAL DAILY
Qty: 90 TABLET | Refills: 1 | Status: SHIPPED | OUTPATIENT
Start: 2020-12-29 | End: 2021-02-19 | Stop reason: ALTCHOICE

## 2020-12-29 RX ORDER — ALPRAZOLAM 0.5 MG/1
0.5 TABLET ORAL 2 TIMES DAILY PRN
Qty: 30 TABLET | Refills: 1 | Status: SHIPPED | OUTPATIENT
Start: 2020-12-29 | End: 2021-05-03

## 2020-12-29 RX ORDER — BUPROPION HYDROCHLORIDE 300 MG/1
300 TABLET ORAL DAILY
Qty: 90 TABLET | Refills: 1 | Status: SHIPPED | OUTPATIENT
Start: 2020-12-29 | End: 2021-06-21

## 2020-12-29 NOTE — TELEPHONE ENCOUNTER
Pt just had a phone appt with Dr Dian Bruce and she needs to get a new Rx for buPROPion HCl ER, XL, 300 MG Oral Tablet 24 Hr but she needs it to be 150mg and it has to be a new Rx for ins and pharmacy purposes.

## 2020-12-29 NOTE — PROGRESS NOTES
Virtual Telephone Check-In    Cleta Cranker verbally consents to a Virtual/Telephone Check-In visit on 12/29/20. Patient has been referred to the [facility] website at [email or web address] to review the yearly Consent to Treat document.     Patient underst

## 2020-12-29 NOTE — TELEPHONE ENCOUNTER
Called LMOM to call back to see if she needs 3-150 mg tablets daily instead of the 300 mg with one, 150 mg tablet.

## 2020-12-29 NOTE — TELEPHONE ENCOUNTER
Refilled medication patient is taking 450 mg daily so needs 1-300 mg tablet with 1-150 mg tablet daily

## 2021-02-01 ENCOUNTER — MOBILE ENCOUNTER (OUTPATIENT)
Dept: FAMILY MEDICINE CLINIC | Facility: CLINIC | Age: 51
End: 2021-02-01

## 2021-02-01 ENCOUNTER — TELEPHONE (OUTPATIENT)
Dept: FAMILY MEDICINE CLINIC | Facility: CLINIC | Age: 51
End: 2021-02-01

## 2021-02-01 RX ORDER — NORETHINDRONE ACETATE AND ETHINYL ESTRADIOL 1MG-20(21)
1 KIT ORAL DAILY
Qty: 3 PACKAGE | Refills: 3 | Status: SHIPPED | OUTPATIENT
Start: 2021-02-01 | End: 2021-09-29

## 2021-02-01 RX ORDER — NORETHINDRONE ACETATE AND ETHINYL ESTRADIOL 1MG-20(21)
1 KIT ORAL DAILY
Qty: 3 PACKAGE | Refills: 0 | Status: SHIPPED | OUTPATIENT
Start: 2021-02-01 | End: 2021-02-01

## 2021-02-01 RX ORDER — NORETHINDRONE ACETATE AND ETHINYL ESTRADIOL 1MG-20(21)
1 KIT ORAL DAILY
Qty: 84 TABLET | Refills: 0 | Status: CANCELLED | OUTPATIENT
Start: 2021-02-01

## 2021-02-01 RX ORDER — NORETHINDRONE ACETATE AND ETHINYL ESTRADIOL 1MG-20(21)
1 KIT ORAL DAILY
Qty: 3 PACKAGE | Refills: 3 | Status: SHIPPED | OUTPATIENT
Start: 2021-02-01 | End: 2021-02-01

## 2021-02-01 NOTE — TELEPHONE ENCOUNTER
Patient called on call phone at 5 05 pm stating that pharmacy never received prescription. Resent and told her to call there before driving to get it filled.

## 2021-02-01 NOTE — TELEPHONE ENCOUNTER
Pt requesting a refill on her JUNEL FE 1/20 1-20 [facility] (already [facility] as she is all out)

## 2021-02-01 NOTE — TELEPHONE ENCOUNTER
Medication refilled per protocol. Requested Prescriptions     Signed Prescriptions Disp Refills   • Norethin Ace-Eth Estrad-FE (JUNEL FE 1/20) 1-20 MG-MCG Oral Tab 3 Package 0     Sig: Take 1 tablet by mouth daily.      Authorizing Provider: Christine Montague

## 2021-02-19 ENCOUNTER — HOSPITAL ENCOUNTER (OUTPATIENT)
Dept: CT IMAGING | Age: 51
Discharge: HOME | End: 2021-02-19
Attending: FAMILY MEDICINE
Payer: COMMERCIAL

## 2021-02-19 ENCOUNTER — OFFICE VISIT (OUTPATIENT)
Dept: FAMILY MEDICINE CLINIC | Facility: CLINIC | Age: 51
End: 2021-02-19
Payer: COMMERCIAL

## 2021-02-19 VITALS
BODY MASS INDEX: 24.88 KG/M2 | HEART RATE: 88 BPM | WEIGHT: 168 LBS | RESPIRATION RATE: 14 BRPM | DIASTOLIC BLOOD PRESSURE: 70 MMHG | OXYGEN SATURATION: 98 % | SYSTOLIC BLOOD PRESSURE: 110 MMHG | HEIGHT: 69 IN

## 2021-02-19 DIAGNOSIS — R10.30 LOWER ABDOMINAL PAIN: ICD-10-CM

## 2021-02-19 DIAGNOSIS — R10.30 LOWER ABDOMINAL PAIN: Primary | ICD-10-CM

## 2021-02-19 LAB
APPEARANCE: CLEAR
BILIRUBIN: NEGATIVE
CREAT BLD-MCNC: 0.7 MG/DL
KETONES (URINE DIPSTICK): NEGATIVE MG/DL
LEUKOCYTES: NEGATIVE
MULTISTIX LOT#: 4043 NUMERIC
NITRITE, URINE: NEGATIVE
PH, URINE: 7 (ref 4.5–8)
PROTEIN (URINE DIPSTICK): NEGATIVE MG/DL
SPECIFIC GRAVITY: 1.02 (ref 1–1.03)
URINE-COLOR: YELLOW
UROBILINOGEN,SEMI-QN: 0.2 MG/DL (ref 0–1.9)

## 2021-02-19 PROCEDURE — 99214 OFFICE O/P EST MOD 30 MIN: CPT | Performed by: FAMILY MEDICINE

## 2021-02-19 PROCEDURE — 3008F BODY MASS INDEX DOCD: CPT | Performed by: FAMILY MEDICINE

## 2021-02-19 PROCEDURE — 74177 CT ABD & PELVIS W/CONTRAST: CPT | Performed by: FAMILY MEDICINE

## 2021-02-19 PROCEDURE — 81003 URINALYSIS AUTO W/O SCOPE: CPT | Performed by: FAMILY MEDICINE

## 2021-02-19 PROCEDURE — 87077 CULTURE AEROBIC IDENTIFY: CPT | Performed by: FAMILY MEDICINE

## 2021-02-19 PROCEDURE — 82565 ASSAY OF CREATININE: CPT

## 2021-02-19 PROCEDURE — 3074F SYST BP LT 130 MM HG: CPT | Performed by: FAMILY MEDICINE

## 2021-02-19 PROCEDURE — 87086 URINE CULTURE/COLONY COUNT: CPT | Performed by: FAMILY MEDICINE

## 2021-02-19 PROCEDURE — 3078F DIAST BP <80 MM HG: CPT | Performed by: FAMILY MEDICINE

## 2021-02-19 RX ORDER — CIPROFLOXACIN 500 MG/1
500 TABLET, FILM COATED ORAL 2 TIMES DAILY
Qty: 14 TABLET | Refills: 0 | Status: SHIPPED | OUTPATIENT
Start: 2021-02-19 | End: 2021-03-24 | Stop reason: ALTCHOICE

## 2021-02-19 NOTE — PROGRESS NOTES
Chief Complaint:  Patient presents with:  Abdominal Pain: On going x 1 week       HPI:  This is a 45year old female patient presenting for Abdominal Pain (On going x 1 week )    Notes she had similar symptoms last year.  At that time, thought this was due Ovarian Cancer Paternal Grandmother 72        estimate, ? whether it was ovarian or bladder   • Cancer Father 61        lung   • Other (lung cancer) Father    • Cancer Maternal Grandmother         throat   • Colon Polyps Mother       Social History    Luis Campbell GENERAL: vitals reviewed and listed above, alert, oriented, appears well hydrated and in no acute distress  HEENT: atraumatic, conjunctiva clear, no obvious abnormalities on inspection   LUNGS: clear to auscultation bilaterally, no wheezes, rales or rh rebound tenderness and not able to r/o appendicitis, will obtain CT. Given hx of recurrent UTI, will send urine for culture and start cipro.    -     URINALYSIS, AUTO, W/O SCOPE  -     URINE CULTURE, ROUTINE; Future  -     URINE CULTURE, ROUTINE  -     CT A

## 2021-02-23 ENCOUNTER — TELEPHONE (OUTPATIENT)
Dept: FAMILY MEDICINE CLINIC | Facility: CLINIC | Age: 51
End: 2021-02-23

## 2021-02-23 NOTE — TELEPHONE ENCOUNTER
Patient awaiting results on urine test, please call.  States she saw results on MyChart looks normal to her but wants to make sure

## 2021-02-24 NOTE — TELEPHONE ENCOUNTER
Culture grew a skin bacteria. This may be contaminant. The medication given should cover the infection if present in the urine. Please let me know if you have any questions.   [address], DO 2/23/2021 10:09 PM

## 2021-03-19 NOTE — PROGRESS NOTES
CHIEF COMPLAINT:   Patient presents with:  Burning On Urination: sx x 2 days. HPI:   Donny Javed is a 49year old female who presents with symptoms of UTI. Complaining of urinary frequency, urgency, dysuria for last 2 days.  Symptoms have been p Azelaic Acid (FINACEA) 15 % External Foam Apply 1 Application topically 2 (two) times daily.  Apply around the nose twice daily Disp: 50 g Rfl: 2   Econazole Nitrate 1 % External Cream Apply to feet twice daily for 3 weeks Disp: 30 g Rfl: 2   Desonide 0.05 OCCULT BLOOD Trace Negative   PH, URINE 7.0 4.5 - 8.0   PROTEIN (URINE DIPSTICK) 30 Negative/Trace mg/dL   UROBILINOGEN,SEMI-QN 0.2 0.0 - 1.9 mg/dL   NITRITE, URINE Negative Negative   LEUKOCYTES Small Negative   APPEARANCE yellow Clear   URINE-COLOR cloud The phrases \"bladder infection,\" \"UTI,\" and \"cystitis\" are often used to describe the same thing. But they are not always the same. Cystitis is an inflammation of the bladder. The most common cause of cystitis is an infection.   Symptoms  The infectio · Take antibiotics until they are used up, even if you feel better. It is important to finish them to make sure the infection has cleared. · You can use acetaminophen or ibuprofen for pain, fever, or discomfort, unless another medicine was prescribed.  If If X-rays were done, you will be told if the results will affect your treatment.   Call 911  Call 911 if any of the following occur:  · Trouble breathing  · Hard to wake up or confusion  · Fainting or loss of consciousness  · Rapid heart rate  When to seek 106.9

## 2021-03-24 ENCOUNTER — OFFICE VISIT (OUTPATIENT)
Dept: FAMILY MEDICINE CLINIC | Facility: CLINIC | Age: 51
End: 2021-03-24
Payer: COMMERCIAL

## 2021-03-24 VITALS
WEIGHT: 167 LBS | DIASTOLIC BLOOD PRESSURE: 78 MMHG | SYSTOLIC BLOOD PRESSURE: 118 MMHG | HEIGHT: 69 IN | RESPIRATION RATE: 14 BRPM | HEART RATE: 80 BPM | BODY MASS INDEX: 24.73 KG/M2 | TEMPERATURE: 97 F

## 2021-03-24 DIAGNOSIS — H91.92 DECREASED HEARING OF LEFT EAR: ICD-10-CM

## 2021-03-24 DIAGNOSIS — H61.22 IMPACTED CERUMEN OF LEFT EAR: ICD-10-CM

## 2021-03-24 DIAGNOSIS — H93.8X2 PRESSURE SENSATION IN LEFT EAR: Primary | ICD-10-CM

## 2021-03-24 PROCEDURE — 3074F SYST BP LT 130 MM HG: CPT | Performed by: NURSE PRACTITIONER

## 2021-03-24 PROCEDURE — 69210 REMOVE IMPACTED EAR WAX UNI: CPT | Performed by: NURSE PRACTITIONER

## 2021-03-24 PROCEDURE — 99212 OFFICE O/P EST SF 10 MIN: CPT | Performed by: NURSE PRACTITIONER

## 2021-03-24 PROCEDURE — 3008F BODY MASS INDEX DOCD: CPT | Performed by: NURSE PRACTITIONER

## 2021-03-24 PROCEDURE — 3078F DIAST BP <80 MM HG: CPT | Performed by: NURSE PRACTITIONER

## 2021-03-24 NOTE — PATIENT INSTRUCTIONS
Use Debrox ear kit once monthly (as per package instructions) to prevent wax build up. Use Flonase, one spray, each nostril twice daily for 7 days. I usually use Dr. Semaj Go for ENT, if this is not better after above measures.

## 2021-03-24 NOTE — PROGRESS NOTES
Patient presents with:  Ear Problem: x 4 days, on and off clogged       HPI:  Presents with approx 4 day history of intermittent sensation of \"clogged\" left ear and diminished hearing from this ear.  Denies fever/chills, sore throat, cough, ear pain, sinu Otoscopic examination of the left tympanic membrane is not possible due to cerumen obstruction in the ear canal. Requiring cerumen removal:  Procedure:  Removal of cerumen of left canal. The removal of impacted cerumen required the expertise of an APN prov

## 2021-03-30 DIAGNOSIS — G47.9 DIFFICULTY SLEEPING: ICD-10-CM

## 2021-04-01 NOTE — TELEPHONE ENCOUNTER
Refill request for:    Requested Prescriptions     Pending Prescriptions Disp Refills   • ZOLPIDEM TARTRATE 10 MG Oral Tab [Pharmacy Med Name: ZOLPIDEM TARTRATE 10 MG TABLET] 90 tablet 0     Sig: TAKE 1 TABLET BY MOUTH EVERY DAY AT [address] [facility]

## 2021-04-02 RX ORDER — ZOLPIDEM TARTRATE 10 MG/1
TABLET ORAL
Qty: 90 TABLET | Refills: 0 | Status: SHIPPED | OUTPATIENT
Start: 2021-04-02 | End: 2021-06-28

## 2021-04-12 ENCOUNTER — TELEPHONE (OUTPATIENT)
Dept: FAMILY MEDICINE CLINIC | Facility: CLINIC | Age: 51
End: 2021-04-12

## 2021-04-12 DIAGNOSIS — Z12.31 ENCOUNTER FOR SCREENING MAMMOGRAM FOR BREAST CANCER: Primary | ICD-10-CM

## 2021-04-27 ENCOUNTER — HOSPITAL ENCOUNTER (OUTPATIENT)
Dept: MAMMOGRAPHY | Facility: HOSPITAL | Age: 51
Discharge: HOME | End: 2021-04-27
Attending: FAMILY MEDICINE
Payer: COMMERCIAL

## 2021-04-27 DIAGNOSIS — Z12.31 ENCOUNTER FOR SCREENING MAMMOGRAM FOR BREAST CANCER: ICD-10-CM

## 2021-04-27 PROCEDURE — 77067 SCR MAMMO BI INCL CAD: CPT | Performed by: FAMILY MEDICINE

## 2021-04-27 PROCEDURE — 77063 BREAST TOMOSYNTHESIS BI: CPT | Performed by: FAMILY MEDICINE

## 2021-05-03 DIAGNOSIS — F43.9 STRESS: ICD-10-CM

## 2021-05-03 DIAGNOSIS — F41.9 ANXIETY: ICD-10-CM

## 2021-05-03 RX ORDER — ALPRAZOLAM 0.5 MG/1
0.5 TABLET ORAL 2 TIMES DAILY PRN
Qty: 30 TABLET | Refills: 1 | Status: SHIPPED | OUTPATIENT
Start: 2021-05-03 | End: 2021-06-28

## 2021-05-03 NOTE — TELEPHONE ENCOUNTER
Refill request for:    Requested Prescriptions     Pending Prescriptions Disp Refills   • ALPRAZOLAM 0.5 MG Oral Tab [Pharmacy Med Name: ALPRAZOLAM 0.5 MG TABLET] 30 tablet 1     Sig: TAKE 1 TABLET (0.5 MG TOTAL) BY MOUTH 2 (TWO) TIMES DAILY AS NEEDED FOR

## 2021-05-18 ENCOUNTER — TELEPHONE (OUTPATIENT)
Dept: FAMILY MEDICINE CLINIC | Facility: CLINIC | Age: 51
End: 2021-05-18

## 2021-05-18 NOTE — TELEPHONE ENCOUNTER
Patient completed medical records release form requesting medical records from 01/2019-05/2021.  Records printed and given to patient

## 2021-06-21 DIAGNOSIS — F43.9 STRESS: ICD-10-CM

## 2021-06-21 DIAGNOSIS — F41.9 ANXIETY: ICD-10-CM

## 2021-06-21 NOTE — TELEPHONE ENCOUNTER
Refill request for:    Requested Prescriptions     Pending Prescriptions Disp Refills   • BUPROPION HCL ER, XL, 300 MG Oral Tablet 24 Hr [Pharmacy Med Name: BUPROPION HCL  MG TABLET] 90 tablet 1     Sig: TAKE 1 TABLET (300 MG TOTAL) BY MOUTH DAILY.  Amber Hall

## 2021-06-24 DIAGNOSIS — F41.9 ANXIETY: ICD-10-CM

## 2021-06-24 DIAGNOSIS — F43.9 STRESS: ICD-10-CM

## 2021-06-24 DIAGNOSIS — G47.9 DIFFICULTY SLEEPING: ICD-10-CM

## 2021-06-26 RX ORDER — BUPROPION HYDROCHLORIDE 300 MG/1
300 TABLET ORAL DAILY
Qty: 90 TABLET | Refills: 1 | Status: SHIPPED | OUTPATIENT
Start: 2021-06-26 | End: 2021-10-24

## 2021-06-28 NOTE — TELEPHONE ENCOUNTER
Refill request for:    Requested Prescriptions     Pending Prescriptions Disp Refills   • ZOLPIDEM TARTRATE 10 MG Oral Tab [Pharmacy Med Name: ZOLPIDEM TARTRATE 10 MG TABLET] 90 tablet 0     Sig: TAKE 1 TABLET BY MOUTH EVERY DAY AT NIGHT   • ALPRAZOLAM 0.5

## 2021-06-29 RX ORDER — ZOLPIDEM TARTRATE 10 MG/1
TABLET ORAL
Qty: 30 TABLET | Refills: 0 | Status: SHIPPED | OUTPATIENT
Start: 2021-06-29 | End: 2021-07-29

## 2021-06-29 RX ORDER — ALPRAZOLAM 0.5 MG/1
0.5 TABLET ORAL 2 TIMES DAILY PRN
Qty: 30 TABLET | Refills: 0 | Status: SHIPPED | OUTPATIENT
Start: 2021-06-29 | End: 2021-08-26

## 2021-07-27 DIAGNOSIS — G47.9 DIFFICULTY SLEEPING: ICD-10-CM

## 2021-07-28 DIAGNOSIS — G47.9 DIFFICULTY SLEEPING: ICD-10-CM

## 2021-07-28 NOTE — TELEPHONE ENCOUNTER
Refill request for:    Requested Prescriptions     Pending Prescriptions Disp Refills   • ZOLPIDEM 10 MG Oral Tab [Pharmacy Med Name: ZOLPIDEM TARTRATE 10 MG TABLET] 30 tablet 0     Sig: TAKE 1 TABLET BY MOUTH EVERY DAY AT NIGHT        Last Prescribed Noelle Suero

## 2021-07-29 RX ORDER — ZOLPIDEM TARTRATE 10 MG/1
TABLET ORAL
Qty: 30 TABLET | Refills: 0 | Status: SHIPPED | OUTPATIENT
Start: 2021-07-29 | End: 2021-08-26

## 2021-08-02 RX ORDER — ZOLPIDEM TARTRATE 10 MG/1
10 TABLET ORAL NIGHTLY
Qty: 30 TABLET | Refills: 0 | OUTPATIENT
Start: 2021-08-02

## 2021-08-09 ENCOUNTER — TELEPHONE (OUTPATIENT)
Dept: FAMILY MEDICINE CLINIC | Facility: CLINIC | Age: 51
End: 2021-08-09

## 2021-08-09 DIAGNOSIS — N30.00 ACUTE CYSTITIS WITHOUT HEMATURIA: Primary | ICD-10-CM

## 2021-08-09 RX ORDER — SULFAMETHOXAZOLE AND TRIMETHOPRIM 800; 160 MG/1; MG/1
1 TABLET ORAL 2 TIMES DAILY
Qty: 6 TABLET | Refills: 0 | Status: SHIPPED | OUTPATIENT
Start: 2021-08-09

## 2021-08-09 NOTE — TELEPHONE ENCOUNTER
Pt states she may have a UTI. Sx started 2 days ago. Has a hx of them and would like to have abx called in to pharmacy. Refused to schedule appt as she has a lot going on right now.

## 2021-08-09 NOTE — TELEPHONE ENCOUNTER
C/o UTI for 2 days she is moving from her house today and son is going to college [employer] this week. She will not have a car so she would like to just have an antibiotic sent in for her without having to go out for the testing.

## 2021-08-09 NOTE — TELEPHONE ENCOUNTER
Patient called referral line by mistake. Patient having UTI symptoms, needs to get son to college, burning with urination, odor to urine, has had them in the past so knows exactly what she is dealing with.   Please call back at [phone]

## 2021-08-11 ENCOUNTER — TELEPHONE (OUTPATIENT)
Dept: FAMILY MEDICINE CLINIC | Facility: CLINIC | Age: 51
End: 2021-08-11

## 2021-08-11 DIAGNOSIS — R30.0 DYSURIA: Primary | ICD-10-CM

## 2021-08-11 NOTE — TELEPHONE ENCOUNTER
Pt states she is still having UTI issues and requesting medication.  She was requesting an appt for Friday but have no openings and when we call her back she wants tomorrow between 8 and 9 AM.

## 2021-08-12 NOTE — TELEPHONE ENCOUNTER
Called and talked to patient she is currently in the city and cannot make it to come either here or the urgent care/lab. She is having UTI symptoms for 2 days now. Tomorrow she has to take her son to college [employer].  She will not be able to copme in for ethan

## 2021-08-13 RX ORDER — SULFAMETHOXAZOLE AND TRIMETHOPRIM 800; 160 MG/1; MG/1
1 TABLET ORAL 2 TIMES DAILY
Qty: 14 TABLET | Refills: 0 | Status: SHIPPED | OUTPATIENT
Start: 2021-08-13 | End: 2021-09-16 | Stop reason: ALTCHOICE

## 2021-08-13 NOTE — TELEPHONE ENCOUNTER
Called LIZETH in detail that new prescription has been sent to her pharmacy but if this does not work she will need to be seen

## 2021-08-13 NOTE — TELEPHONE ENCOUNTER
Called and talked to patient she took 3 days of bactrim from 8/9/21 got better but not completely. She is currently driving son to college and has to work the weekend might be able to get to lab for UA next week.  But she was at one point given 7 days of ba

## 2021-08-24 DIAGNOSIS — F43.9 STRESS: ICD-10-CM

## 2021-08-24 DIAGNOSIS — G47.9 DIFFICULTY SLEEPING: ICD-10-CM

## 2021-08-24 DIAGNOSIS — F41.9 ANXIETY: ICD-10-CM

## 2021-08-24 RX ORDER — ZOLPIDEM TARTRATE 10 MG/1
10 TABLET ORAL NIGHTLY
Qty: 30 TABLET | Refills: 0 | Status: CANCELLED | OUTPATIENT
Start: 2021-08-24

## 2021-08-24 RX ORDER — ALPRAZOLAM 0.5 MG/1
0.5 TABLET ORAL 2 TIMES DAILY PRN
Qty: 30 TABLET | Refills: 0 | Status: CANCELLED | OUTPATIENT
Start: 2021-08-24

## 2021-08-25 NOTE — TELEPHONE ENCOUNTER
Patient scheduled with Chip Sees 09/09/21. Patient is going out of state 08/26/21, asking for early refill as she will run out 08/29/21.  Please send to [facility] in [facility]

## 2021-09-16 RX ORDER — NORETHINDRONE ACETATE AND ETHINYL ESTRADIOL AND FERROUS FUMARATE 1MG-20(21)
KIT ORAL
Qty: 28 TABLET | Refills: 5 | OUTPATIENT
Start: 2021-09-16

## 2021-09-16 NOTE — PROGRESS NOTES
Patient presents with: Anxiety: medication refills       This visit was conducted using Telemedicine with live, two-way interactive video and audio.     Patient understands and accepts financial responsibility for any deductible, co-insurance and/or co-pay 300 MG Oral Tablet 24 Hr Take 1 tablet (300 mg total) by mouth daily. Take in combination with 150mg tablet. 90 tablet 1   • Norethin Ace-Eth Estrad-FE (JUNEL FE 1/20) 1-20 MG-MCG Oral Tab Take 1 tablet by mouth daily.  3 Package 3   • Econazole Nitrate 1 % has high likelihood of having resistant bacteria given prior struggles with frequent UTI. U/A ordered. Await results for management. Verbalized understanding of instructions and agreeable to this plan of care.           Orders Placed This Encounter      UA/

## 2021-09-27 ENCOUNTER — TELEPHONE (OUTPATIENT)
Dept: FAMILY MEDICINE CLINIC | Facility: CLINIC | Age: 51
End: 2021-09-27

## 2021-09-27 DIAGNOSIS — F41.9 ANXIETY: ICD-10-CM

## 2021-09-27 DIAGNOSIS — F43.9 STRESS: ICD-10-CM

## 2021-09-27 DIAGNOSIS — G47.9 DIFFICULTY SLEEPING: ICD-10-CM

## 2021-09-28 RX ORDER — ZOLPIDEM TARTRATE 10 MG/1
TABLET ORAL
Qty: 30 TABLET | Refills: 0 | Status: SHIPPED | OUTPATIENT
Start: 2021-09-28 | End: 2021-10-28

## 2021-09-28 RX ORDER — ALPRAZOLAM 0.5 MG/1
0.5 TABLET ORAL 2 TIMES DAILY PRN
Qty: 30 TABLET | Refills: 0 | Status: SHIPPED | OUTPATIENT
Start: 2021-09-28 | End: 2021-10-28

## 2021-09-28 NOTE — TELEPHONE ENCOUNTER
Refill request for:    Requested Prescriptions     Pending Prescriptions Disp Refills   • ZOLPIDEM 10 MG Oral Tab [Pharmacy Med Name: ZOLPIDEM TARTRATE 10 MG TABLET] 30 tablet 0     Sig: TAKE 1 TABLET BY MOUTH EVERY DAY AT NIGHT   • ALPRAZOLAM 0.5 MG Oral

## 2021-09-28 NOTE — TELEPHONE ENCOUNTER
Pt calling status of her medications.  She will be out of her Zolpidem and going out of town tomorrow

## 2021-09-29 RX ORDER — NORETHINDRONE ACETATE AND ETHINYL ESTRADIOL AND FERROUS FUMARATE 1MG-20(21)
KIT ORAL
Qty: 28 TABLET | Refills: 3 | Status: SHIPPED | OUTPATIENT
Start: 2021-09-29 | End: 2022-01-04

## 2021-09-29 NOTE — TELEPHONE ENCOUNTER
Requested Prescriptions     Pending Prescriptions Disp Refills   • JUNEL FE 1/20 1-20 MG-MCG Oral Tab [Pharmacy Med Name: Rafa Mccabe CAROLIN 1 MG-20 MCG TABLET] 28 tablet 5     Sig: TAKE 1 TABLET BY MOUTH EVERY DAY     LOV 3/24/2021   Last Pap 11/18/19  Last physica

## 2021-10-21 DIAGNOSIS — F41.9 ANXIETY: ICD-10-CM

## 2021-10-21 DIAGNOSIS — G47.9 DIFFICULTY SLEEPING: ICD-10-CM

## 2021-10-21 DIAGNOSIS — F43.9 STRESS: ICD-10-CM

## 2021-10-22 NOTE — TELEPHONE ENCOUNTER
Refill request for:    Requested Prescriptions     Pending Prescriptions Disp Refills   • BUPROPION 300 MG Oral Tablet 24 Hr [Pharmacy Med Name: BUPROPION HCL  MG TABLET] 90 tablet 1     Sig: TAKE 1 TABLET (300 MG TOTAL) BY MOUTH DAILY.  TAKE IN COMBI

## 2021-10-24 RX ORDER — BUPROPION HYDROCHLORIDE 300 MG/1
300 TABLET ORAL DAILY
Qty: 90 TABLET | Refills: 1 | Status: SHIPPED | OUTPATIENT
Start: 2021-10-24

## 2021-10-25 NOTE — TELEPHONE ENCOUNTER
Refill request for:    Requested Prescriptions     Pending Prescriptions Disp Refills   • ALPRAZOLAM 0.5 MG Oral Tab [Pharmacy Med Name: ALPRAZOLAM 0.5 MG TABLET] 30 tablet 0     Sig: TAKE 1 TABLET BY MOUTH 2 TIMES DAILY AS NEEDED FOR SLEEP OR ANXIETY.    •

## 2021-10-27 DIAGNOSIS — F41.9 ANXIETY: ICD-10-CM

## 2021-10-27 DIAGNOSIS — G47.9 DIFFICULTY SLEEPING: ICD-10-CM

## 2021-10-27 DIAGNOSIS — F43.9 STRESS: ICD-10-CM

## 2021-10-27 RX ORDER — ALPRAZOLAM 0.5 MG/1
0.5 TABLET ORAL 2 TIMES DAILY PRN
Qty: 30 TABLET | Refills: 0 | OUTPATIENT
Start: 2021-10-27

## 2021-10-27 RX ORDER — ZOLPIDEM TARTRATE 10 MG/1
10 TABLET ORAL NIGHTLY
Qty: 30 TABLET | Refills: 0 | OUTPATIENT
Start: 2021-10-27

## 2021-10-27 NOTE — TELEPHONE ENCOUNTER
Per other request Serenity Batista NP has pended refill till Oct 28th.  Medications will be refilled tomorrow Oct 28

## 2021-10-28 RX ORDER — ZOLPIDEM TARTRATE 10 MG/1
TABLET ORAL
Qty: 30 TABLET | Refills: 0 | Status: SHIPPED | OUTPATIENT
Start: 2021-10-28 | End: 2021-11-24

## 2021-10-28 RX ORDER — ALPRAZOLAM 0.5 MG/1
TABLET ORAL
Qty: 30 TABLET | Refills: 0 | Status: SHIPPED | OUTPATIENT
Start: 2021-10-28 | End: 2022-01-11

## 2021-11-24 DIAGNOSIS — G47.9 DIFFICULTY SLEEPING: ICD-10-CM

## 2021-11-24 RX ORDER — ZOLPIDEM TARTRATE 10 MG/1
10 TABLET ORAL NIGHTLY
Qty: 30 TABLET | Refills: 0 | Status: SHIPPED | OUTPATIENT
Start: 2021-11-24 | End: 2021-12-23

## 2021-11-24 NOTE — TELEPHONE ENCOUNTER
Telemed visit with Jovon Rubalcava 9/16/2021. Requesting Zolpidem refill to be sent to [facility] in [facility].     Routed to Jovon LIRIANO

## 2021-11-24 NOTE — TELEPHONE ENCOUNTER
Pt is calling she is out of her Zolpidem and [facility].

## 2021-12-14 RX ORDER — NORETHINDRONE ACETATE AND ETHINYL ESTRADIOL AND FERROUS FUMARATE 1MG-20(21)
KIT ORAL
Qty: 84 TABLET | Refills: 1 | OUTPATIENT
Start: 2021-12-14

## 2021-12-14 NOTE — TELEPHONE ENCOUNTER
Requested Prescriptions     Pending Prescriptions Disp Refills   • JUNEL FE 1/20 1-20 MG-MCG Oral Tab [Pharmacy Med Name: JUNEL FE 1 MG-20 MCG TABLET] 84 tablet 1     Sig: TAKE 1 TABLET BY MOUTH EVERY DAY     LOV 3/24/2021     Patient was asked to follow-u

## 2021-12-23 DIAGNOSIS — G47.9 DIFFICULTY SLEEPING: ICD-10-CM

## 2021-12-23 RX ORDER — ZOLPIDEM TARTRATE 10 MG/1
TABLET ORAL
Qty: 30 TABLET | Refills: 0 | Status: SHIPPED | OUTPATIENT
Start: 2021-12-23 | End: 2022-01-24

## 2021-12-23 NOTE — TELEPHONE ENCOUNTER
Patient is about to run out and she doesn't want to not want to run out over the holidays.  Zolpidem

## 2022-01-04 RX ORDER — NORETHINDRONE ACETATE AND ETHINYL ESTRADIOL AND FERROUS FUMARATE 1MG-20(21)
KIT ORAL
Qty: 84 TABLET | Refills: 1 | Status: SHIPPED | OUTPATIENT
Start: 2022-01-04

## 2022-01-05 DIAGNOSIS — G47.9 DIFFICULTY SLEEPING: ICD-10-CM

## 2022-01-05 RX ORDER — ZOLPIDEM TARTRATE 10 MG/1
TABLET ORAL
Qty: 30 TABLET | Refills: 0 | OUTPATIENT
Start: 2022-01-05

## 2022-01-05 NOTE — TELEPHONE ENCOUNTER
Refill request for:    Requested Prescriptions     Pending Prescriptions Disp Refills   • ZOLPIDEM 10 MG Oral Tab [Pharmacy Med Name: ZOLPIDEM TARTRATE 10 MG TABLET] 30 tablet 0     Sig: TAKE 1 TABLET BY MOUTH EVERY DAY AT NIGHT        Last Prescribed Suella Bolus

## 2022-01-10 DIAGNOSIS — F43.9 STRESS: ICD-10-CM

## 2022-01-10 DIAGNOSIS — F41.9 ANXIETY: ICD-10-CM

## 2022-01-11 RX ORDER — ALPRAZOLAM 0.5 MG/1
TABLET ORAL
Qty: 30 TABLET | Refills: 0 | Status: SHIPPED | OUTPATIENT
Start: 2022-01-11

## 2022-01-11 NOTE — TELEPHONE ENCOUNTER
Refill request for:    Requested Prescriptions     Pending Prescriptions Disp Refills   • ALPRAZOLAM 0.5 MG Oral Tab [Pharmacy Med Name: ALPRAZOLAM 0.5 MG TABLET] 30 tablet 0     Sig: TAKE 1 TABLET BY MOUTH 2 TIMES DAILY AS NEEDED FOR SLEEP OR ANXIETY.

## 2022-01-22 DIAGNOSIS — G47.9 DIFFICULTY SLEEPING: ICD-10-CM

## 2022-01-24 RX ORDER — ZOLPIDEM TARTRATE 10 MG/1
TABLET ORAL
Qty: 30 TABLET | Refills: 0 | Status: SHIPPED | OUTPATIENT
Start: 2022-01-24

## 2022-01-24 NOTE — TELEPHONE ENCOUNTER
Patient is calling she said she has been out of her medication all weekend and the pharmacist will not refill because it is a controlled substance. She is very upset and has not been sleeping.

## 2022-01-24 NOTE — TELEPHONE ENCOUNTER
LM for patient that your medication has been refilled however for future refills an OV will be required

## 2022-02-07 NOTE — TELEPHONE ENCOUNTER
LM for patient that her medication was refilled but no further refills will be done without her physical appt being made. 3 tries letter sent.

## 2022-02-15 DIAGNOSIS — F41.9 ANXIETY: ICD-10-CM

## 2022-02-15 DIAGNOSIS — F43.9 STRESS: ICD-10-CM

## 2022-02-17 ENCOUNTER — TELEMEDICINE (OUTPATIENT)
Dept: FAMILY MEDICINE CLINIC | Facility: CLINIC | Age: 52
End: 2022-02-17
Payer: COMMERCIAL

## 2022-02-17 DIAGNOSIS — G47.9 DIFFICULTY SLEEPING: ICD-10-CM

## 2022-02-17 DIAGNOSIS — F41.9 ANXIETY: ICD-10-CM

## 2022-02-17 DIAGNOSIS — Z12.31 ENCOUNTER FOR SCREENING MAMMOGRAM FOR MALIGNANT NEOPLASM OF BREAST: Primary | ICD-10-CM

## 2022-02-17 PROCEDURE — 99213 OFFICE O/P EST LOW 20 MIN: CPT | Performed by: NURSE PRACTITIONER

## 2022-02-17 RX ORDER — DULOXETIN HYDROCHLORIDE 20 MG/1
CAPSULE, DELAYED RELEASE ORAL
COMMUNITY
Start: 2021-11-08

## 2022-02-17 RX ORDER — ZOLPIDEM TARTRATE 10 MG/1
10 TABLET ORAL NIGHTLY
Qty: 30 TABLET | Refills: 2 | Status: SHIPPED | OUTPATIENT
Start: 2022-02-23

## 2022-02-17 RX ORDER — BUPROPION HYDROCHLORIDE 150 MG/1
TABLET ORAL
COMMUNITY
Start: 2022-01-25

## 2022-02-17 RX ORDER — ALPRAZOLAM 0.5 MG/1
0.5 TABLET ORAL 2 TIMES DAILY PRN
Qty: 30 TABLET | Refills: 2 | Status: SHIPPED | OUTPATIENT
Start: 2022-02-17

## 2022-02-17 RX ORDER — KETOCONAZOLE 20 MG/G
CREAM TOPICAL
COMMUNITY
Start: 2022-01-21

## 2022-02-17 RX ORDER — ZOLPIDEM TARTRATE 10 MG/1
10 TABLET ORAL NIGHTLY
Qty: 30 TABLET | Refills: 0 | Status: CANCELLED | OUTPATIENT
Start: 2022-02-17

## 2022-02-17 RX ORDER — NITROFURANTOIN 25; 75 MG/1; MG/1
CAPSULE ORAL
COMMUNITY
Start: 2022-01-18

## 2022-02-17 RX ORDER — ALPRAZOLAM 0.5 MG/1
TABLET ORAL
Qty: 30 TABLET | Refills: 0 | OUTPATIENT
Start: 2022-02-17

## 2022-02-21 ENCOUNTER — TELEPHONE (OUTPATIENT)
Dept: FAMILY MEDICINE CLINIC | Facility: CLINIC | Age: 52
End: 2022-02-21

## 2022-02-21 NOTE — TELEPHONE ENCOUNTER
Pt states that per [place] she is to start on her Zolpidem today as she is all out (med sent states to refill on 2/23/22) [facility]

## 2022-02-21 NOTE — TELEPHONE ENCOUNTER
Patient notified. She states she thought Devan Rossi 34 said 2/21. She is okay with waiting for 2/23. She verbalized understanding and agrees with plan.

## 2022-02-21 NOTE — TELEPHONE ENCOUNTER
We discussed at the visit that I would fill it for 2/23, as this controlled substance is monitored very closely. She will need to wait for fill date. Thanks.

## 2022-05-06 NOTE — TELEPHONE ENCOUNTER
LMOM for Ashia Wetzel to return a call to triage to discuss her OCP. We received a refill request for Junel FE. She received a refill 1/4/2022 for #84 with 1 additional. This should be enough to take her to July. Spoke with My,Pharmacist at [facility]. She is wondering if Ashia Wetzel is skipping the placebo week and taking the pill continuously,because she has picked up her prescription early each time. She was due for a mammogram April 2022. An order is entered. And due for a pap 11/18/2022. (done 11/18/2019)

## 2022-05-07 NOTE — TELEPHONE ENCOUNTER
Refill request for:    Requested Prescriptions     Pending Prescriptions Disp Refills   [address] FE 1/20 1-20 MG-MCG Oral Tab [Pharmacy Med Name: [facility] 1 MG-20 MCG TABLET] 84 tablet 1     Sig: TAKE 1 TABLET BY MOUTH EVERY DAY        Last Prescribed Quantity Refills   1/4/2022 84 tab 1     LOV 3/24/2021    Patient was asked to follow-up in: no follow up on file. Appointment scheduled: Visit date not found    Medication not on protocol. # 84 tab with 1 refills routed to Devan London 34 for review.

## 2022-05-07 NOTE — TELEPHONE ENCOUNTER
Refill request for:    Requested Prescriptions     Pending Prescriptions Disp Refills   [address] FE 1/20 1-20 MG-MCG Oral Tab [Pharmacy Med Name: Anat COE 1 MG-20 MCG TABLET] 84 tablet 1     Sig: TAKE 1 TABLET BY MOUTH EVERY DAY        Last Prescribed Quantity Refills   1/4/2022 84 tab 1     LOV 3/24/2021     Patient was asked to follow-up in: no follow up on file. Appointment scheduled: Visit date not found    Medication failed protocol.      # 34 tab with 1 refills routed to Shaan Elizondo [facility] for review

## 2022-05-07 NOTE — TELEPHONE ENCOUNTER
I have not seen patient since 2019. I have only ever refilled for other providers. This needs to be sent to provider that has discussed this with her.

## 2022-05-09 ENCOUNTER — TELEPHONE (OUTPATIENT)
Dept: FAMILY MEDICINE CLINIC | Facility: CLINIC | Age: 52
End: 2022-05-09

## 2022-05-09 RX ORDER — NORETHINDRONE ACETATE AND ETHINYL ESTRADIOL AND FERROUS FUMARATE 1MG-20(21)
KIT ORAL
Qty: 84 TABLET | Refills: 0 | Status: SHIPPED | OUTPATIENT
Start: 2022-05-09

## 2022-05-11 RX ORDER — ALPRAZOLAM 0.5 MG/1
TABLET ORAL
Qty: 30 TABLET | Refills: 2 | Status: SHIPPED | OUTPATIENT
Start: 2022-05-11

## 2022-05-11 RX ORDER — ZOLPIDEM TARTRATE 10 MG/1
TABLET ORAL
Qty: 30 TABLET | Refills: 2 | Status: SHIPPED | OUTPATIENT
Start: 2022-05-11

## 2022-05-18 DIAGNOSIS — G47.9 DIFFICULTY SLEEPING: ICD-10-CM

## 2022-05-18 RX ORDER — ZOLPIDEM TARTRATE 10 MG/1
10 TABLET ORAL NIGHTLY
Qty: 30 TABLET | Refills: 2 | Status: SHIPPED | OUTPATIENT
Start: 2022-05-18

## 2022-05-18 NOTE — TELEPHONE ENCOUNTER
Aaron Blevins from [facility] states that they are unable to dispense meds prescribed by Malachi Grissom as they are having Environmental issues.  Please send to the [facility] on [address] [phone] (was not an option to choose in registration)

## 2022-06-03 DIAGNOSIS — F43.9 STRESS: ICD-10-CM

## 2022-06-03 DIAGNOSIS — F41.9 ANXIETY: ICD-10-CM

## 2022-06-06 RX ORDER — BUPROPION HYDROCHLORIDE 300 MG/1
300 TABLET ORAL DAILY
Qty: 90 TABLET | Refills: 0 | Status: SHIPPED | OUTPATIENT
Start: 2022-06-06

## 2022-07-06 ENCOUNTER — HOSPITAL ENCOUNTER (OUTPATIENT)
Dept: MAMMOGRAPHY | Facility: HOSPITAL | Age: 52
Discharge: HOME | End: 2022-07-06
Attending: NURSE PRACTITIONER
Payer: COMMERCIAL

## 2022-07-06 DIAGNOSIS — Z12.31 ENCOUNTER FOR SCREENING MAMMOGRAM FOR MALIGNANT NEOPLASM OF BREAST: ICD-10-CM

## 2022-07-06 PROCEDURE — 77063 BREAST TOMOSYNTHESIS BI: CPT | Performed by: NURSE PRACTITIONER

## 2022-07-06 PROCEDURE — 77067 SCR MAMMO BI INCL CAD: CPT | Performed by: NURSE PRACTITIONER

## 2022-07-10 DIAGNOSIS — N94.3 PREMENSTRUAL TENSION: ICD-10-CM

## 2022-07-11 ENCOUNTER — TELEPHONE (OUTPATIENT)
Dept: FAMILY MEDICINE CLINIC | Facility: CLINIC | Age: 52
End: 2022-07-11

## 2022-07-11 DIAGNOSIS — N94.3 PREMENSTRUAL TENSION: ICD-10-CM

## 2022-07-11 RX ORDER — NORETHINDRONE ACETATE AND ETHINYL ESTRADIOL AND FERROUS FUMARATE 1MG-20(21)
KIT ORAL
Qty: 84 TABLET | Refills: 0 | OUTPATIENT
Start: 2022-07-11

## 2022-07-11 RX ORDER — ESCITALOPRAM OXALATE 5 MG/1
5 TABLET ORAL DAILY
COMMUNITY
Start: 2022-05-23

## 2022-07-11 RX ORDER — NORETHINDRONE ACETATE AND ETHINYL ESTRADIOL 1MG-20(21)
1 KIT ORAL DAILY
Qty: 84 TABLET | Refills: 0 | Status: SHIPPED | OUTPATIENT
Start: 2022-07-11

## 2022-07-11 NOTE — TELEPHONE ENCOUNTER
Pt calling to this med refilled. Please refill. She is completely out. Hasn't taken today or yesterday.

## 2022-07-11 NOTE — TELEPHONE ENCOUNTER
Medication Quantity Refills Start End   Norethin Ace-Eth Estrad-FE (JUNEL FE 1/20) 1-20 MG-MCG Oral Tab 84 tablet 0 7/11/2022      Medication already filled  Denying duplicate request

## 2022-07-11 NOTE — TELEPHONE ENCOUNTER
Pt scheduled for 7/19/22 with Nayla Quintanilla Pt stating she is starting to experience side effects from not taking this medication.

## 2022-07-11 NOTE — TELEPHONE ENCOUNTER
See 5/9/22 telephone encounter. Patient was advised to schedule physical at that time. Last appointment video visit (anxiety/sleep issues)  Last physical 11/30/20. No upcoming appointment scheduled. Routed to front staff for appointment scheduling. Please route back to triage once patient has scheduled an appointment.

## 2022-07-22 DIAGNOSIS — F41.9 ANXIETY: ICD-10-CM

## 2022-07-25 RX ORDER — ALPRAZOLAM 0.5 MG/1
TABLET ORAL
Qty: 30 TABLET | Refills: 0 | OUTPATIENT
Start: 2022-07-25

## 2022-07-25 NOTE — TELEPHONE ENCOUNTER
She had appointment for 7/19/22 but did not come in. Will need visit for refill of controlled substance. Thanks.

## 2022-11-07 PROBLEM — N39.0 RECURRENT UTI: Status: ACTIVE | Noted: 2022-01-18

## 2022-11-07 PROBLEM — F39 EPISODIC MOOD DISORDER: Status: ACTIVE | Noted: 2021-11-09

## 2022-11-07 PROBLEM — R87.620 ATYPICAL SQUAMOUS CELL CHANGES OF UNDETERMINED SIGNIFICANCE (ASCUS) ON VAGINAL CYTOLOGY: Status: ACTIVE | Noted: 2021-11-08

## 2022-11-07 PROBLEM — F39 EPISODIC MOOD DISORDER (HCC): Status: ACTIVE | Noted: 2021-11-09

## 2022-11-09 ENCOUNTER — OFFICE VISIT (OUTPATIENT)
Dept: FAMILY MEDICINE CLINIC | Facility: CLINIC | Age: 52
End: 2022-11-09
Payer: COMMERCIAL

## 2022-11-09 VITALS
DIASTOLIC BLOOD PRESSURE: 76 MMHG | SYSTOLIC BLOOD PRESSURE: 120 MMHG | HEIGHT: 69 IN | TEMPERATURE: 97 F | WEIGHT: 180 LBS | BODY MASS INDEX: 26.66 KG/M2 | HEART RATE: 80 BPM

## 2022-11-09 DIAGNOSIS — G47.00 INSOMNIA, UNSPECIFIED TYPE: ICD-10-CM

## 2022-11-09 DIAGNOSIS — B35.3 TINEA PEDIS OF RIGHT FOOT: ICD-10-CM

## 2022-11-09 DIAGNOSIS — Z28.20 VACCINE REFUSED BY PATIENT: ICD-10-CM

## 2022-11-09 DIAGNOSIS — Z00.00 ENCOUNTER FOR ROUTINE HISTORY AND PHYSICAL EXAMINATION: Primary | ICD-10-CM

## 2022-11-09 DIAGNOSIS — F41.9 ANXIETY: ICD-10-CM

## 2022-11-09 DIAGNOSIS — N94.3 PREMENSTRUAL TENSION: ICD-10-CM

## 2022-11-09 PROCEDURE — 99213 OFFICE O/P EST LOW 20 MIN: CPT | Performed by: STUDENT IN AN ORGANIZED HEALTH CARE EDUCATION/TRAINING PROGRAM

## 2022-11-09 PROCEDURE — 3074F SYST BP LT 130 MM HG: CPT | Performed by: STUDENT IN AN ORGANIZED HEALTH CARE EDUCATION/TRAINING PROGRAM

## 2022-11-09 PROCEDURE — 3078F DIAST BP <80 MM HG: CPT | Performed by: STUDENT IN AN ORGANIZED HEALTH CARE EDUCATION/TRAINING PROGRAM

## 2022-11-09 PROCEDURE — 99396 PREV VISIT EST AGE 40-64: CPT | Performed by: STUDENT IN AN ORGANIZED HEALTH CARE EDUCATION/TRAINING PROGRAM

## 2022-11-09 PROCEDURE — 3008F BODY MASS INDEX DOCD: CPT | Performed by: STUDENT IN AN ORGANIZED HEALTH CARE EDUCATION/TRAINING PROGRAM

## 2022-11-09 RX ORDER — KETOCONAZOLE 20 MG/G
1 CREAM TOPICAL DAILY
Qty: 1 EACH | Refills: 0 | Status: SHIPPED | OUTPATIENT
Start: 2022-11-09

## 2022-11-09 RX ORDER — ZOLPIDEM TARTRATE 10 MG/1
10 TABLET ORAL NIGHTLY
Qty: 30 TABLET | Refills: 5 | Status: SHIPPED | OUTPATIENT
Start: 2022-11-09

## 2022-11-09 RX ORDER — ALPRAZOLAM 0.5 MG/1
0.5 TABLET ORAL 2 TIMES DAILY PRN
Qty: 12 TABLET | Refills: 0 | Status: SHIPPED | OUTPATIENT
Start: 2022-11-09

## 2022-11-29 DIAGNOSIS — N94.3 PREMENSTRUAL TENSION: ICD-10-CM

## 2022-11-30 ENCOUNTER — TELEPHONE (OUTPATIENT)
Dept: FAMILY MEDICINE CLINIC | Facility: CLINIC | Age: 52
End: 2022-11-30

## 2022-11-30 NOTE — TELEPHONE ENCOUNTER
Called pt to ask if she is still taking the requested medication. It was discontinued on 11/9/22. No answer, left a message. Please ask patient when she calls back. The original prescription was discontinued on 11/9/2022 by Kayla Nagy[address] [place] [address] Lizzie for the following reason: Therapy completed. Renewing this prescription may not be appropriate.

## 2022-12-29 RX ORDER — NORETHINDRONE ACETATE AND ETHINYL ESTRADIOL AND FERROUS FUMARATE 1MG-20(21)
KIT ORAL
Qty: 84 TABLET | Refills: 0 | OUTPATIENT
Start: 2022-12-29

## 2023-02-03 DIAGNOSIS — F41.9 ANXIETY: ICD-10-CM

## 2023-02-03 RX ORDER — ALPRAZOLAM 0.5 MG/1
TABLET ORAL
Qty: 30 TABLET | Refills: 0 | Status: SHIPPED | OUTPATIENT
Start: 2023-02-03

## 2023-03-02 DIAGNOSIS — F41.9 ANXIETY: ICD-10-CM

## 2023-03-02 NOTE — TELEPHONE ENCOUNTER
Pt would like a refill on ALPRAZOLAM 0.5 MG Oral Tab       Please send to [facility] at [address]  [facility] [phone]

## 2023-03-03 RX ORDER — ALPRAZOLAM 0.5 MG/1
0.5 TABLET ORAL 2 TIMES DAILY PRN
Qty: 30 TABLET | Refills: 0 | OUTPATIENT
Start: 2023-03-03

## 2023-03-27 ENCOUNTER — TELEPHONE (OUTPATIENT)
Dept: FAMILY MEDICINE CLINIC | Facility: CLINIC | Age: 53
End: 2023-03-27

## 2023-03-27 DIAGNOSIS — G47.00 INSOMNIA, UNSPECIFIED TYPE: ICD-10-CM

## 2023-03-27 DIAGNOSIS — F41.9 ANXIETY: ICD-10-CM

## 2023-03-27 RX ORDER — ZOLPIDEM TARTRATE 10 MG/1
10 TABLET ORAL NIGHTLY
Qty: 30 TABLET | Refills: 0 | Status: SHIPPED | OUTPATIENT
Start: 2023-03-27 | End: 2023-03-27

## 2023-03-27 RX ORDER — ALPRAZOLAM 0.5 MG/1
0.5 TABLET ORAL 2 TIMES DAILY PRN
Qty: 30 TABLET | Refills: 0 | Status: SHIPPED | OUTPATIENT
Start: 2023-03-27

## 2023-03-27 RX ORDER — ALPRAZOLAM 0.5 MG/1
0.5 TABLET ORAL 2 TIMES DAILY PRN
Qty: 30 TABLET | Refills: 0 | Status: SHIPPED | OUTPATIENT
Start: 2023-03-27 | End: 2023-03-27

## 2023-03-27 RX ORDER — ZOLPIDEM TARTRATE 10 MG/1
10 TABLET ORAL NIGHTLY
Qty: 30 TABLET | Refills: 0 | Status: SHIPPED | OUTPATIENT
Start: 2023-03-27

## 2023-03-27 NOTE — TELEPHONE ENCOUNTER
Sent to [facility] as requested for short term supply. Will address further refills at upcoming appointment.

## 2023-03-27 NOTE — TELEPHONE ENCOUNTER
Pt requesting refill of alprazolam and zolpidem    Zolpidem last refilled 11/9/22 #30 with 5 refills  Alprazolam last refilled 2/3/2023 #30 with no refills    LOV 11/9/2022    Future Appointments   Date Time Provider Naseem Collazo   3/29/2023  9:30 AM Sarbjit Carter NP [facility] 3 [facility]      pended to HERI Garcia for review

## 2023-03-27 NOTE — TELEPHONE ENCOUNTER
Pt is calling for a refill on her   ALPRAZOLAM 0.5 MG Oral Tab  zolpidem 10 MG Oral Tab    Has apt 3/29/23 for med check

## 2023-03-27 NOTE — TELEPHONE ENCOUNTER
Pt call to give us a pharmacy Inf. [address] [place] [address]  [phone]    Please send my med.  to this pharmacy

## 2023-03-29 ENCOUNTER — TELEMEDICINE (OUTPATIENT)
Dept: FAMILY MEDICINE CLINIC | Facility: CLINIC | Age: 53
End: 2023-03-29
Payer: MEDICAID

## 2023-03-29 DIAGNOSIS — G47.00 INSOMNIA, UNSPECIFIED TYPE: Primary | ICD-10-CM

## 2023-03-29 DIAGNOSIS — F41.9 ANXIETY: ICD-10-CM

## 2023-03-29 PROCEDURE — 99214 OFFICE O/P EST MOD 30 MIN: CPT | Performed by: NURSE PRACTITIONER

## 2023-03-29 RX ORDER — ZOLPIDEM TARTRATE 10 MG/1
10 TABLET ORAL NIGHTLY
Qty: 30 TABLET | Refills: 1 | Status: SHIPPED | OUTPATIENT
Start: 2023-04-27

## 2023-05-23 DIAGNOSIS — F41.9 ANXIETY: ICD-10-CM

## 2023-05-23 RX ORDER — ALPRAZOLAM 0.5 MG/1
TABLET ORAL
Qty: 30 TABLET | Refills: 0 | Status: SHIPPED | OUTPATIENT
Start: 2023-05-23

## 2023-06-29 ENCOUNTER — TELEPHONE (OUTPATIENT)
Dept: FAMILY MEDICINE CLINIC | Facility: CLINIC | Age: 53
End: 2023-06-29

## 2023-06-29 DIAGNOSIS — Z12.31 SCREENING MAMMOGRAM FOR BREAST CANCER: Primary | ICD-10-CM

## 2023-07-17 DIAGNOSIS — F41.9 ANXIETY: ICD-10-CM

## 2023-07-17 RX ORDER — ALPRAZOLAM 0.5 MG/1
0.5 TABLET ORAL 2 TIMES DAILY PRN
Qty: 30 TABLET | Refills: 0 | Status: SHIPPED | OUTPATIENT
Start: 2023-07-17

## 2023-07-17 NOTE — TELEPHONE ENCOUNTER
Refill request for:    Requested Prescriptions     Pending Prescriptions Disp Refills    ALPRAZOLAM 0.5 MG Oral Tab [Pharmacy Med Name: ALPRAZOLAM 0.5MG TABLETS] 30 tablet 0     Sig: TAKE 1 TABLET(0.5 MG) BY MOUTH TWICE DAILY AS NEEDED FOR SLEEP OR ANXIETY        Last Prescribed Quantity Refills   5/23/23 30 0     LOV 11/9/2022 Tele: 7/3/23    Patient was asked to follow-up in: no follow ups on file. Appointment scheduled: Visit date not found    Medication not on protocol.      # 30 with 0 refills routed to HERI Padilla for review

## 2023-09-09 DIAGNOSIS — F41.9 ANXIETY: ICD-10-CM

## 2023-09-12 RX ORDER — ALPRAZOLAM 0.5 MG/1
0.5 TABLET ORAL 2 TIMES DAILY PRN
Qty: 30 TABLET | Refills: 0 | Status: SHIPPED | OUTPATIENT
Start: 2023-09-12

## 2023-10-05 DIAGNOSIS — G47.00 INSOMNIA, UNSPECIFIED TYPE: ICD-10-CM

## 2023-10-06 ENCOUNTER — TELEPHONE (OUTPATIENT)
Dept: FAMILY MEDICINE CLINIC | Facility: CLINIC | Age: 53
End: 2023-10-06

## 2023-10-06 DIAGNOSIS — F41.9 ANXIETY: ICD-10-CM

## 2023-10-06 RX ORDER — ZOLPIDEM TARTRATE 10 MG/1
10 TABLET ORAL NIGHTLY
Qty: 30 TABLET | Refills: 0 | OUTPATIENT
Start: 2023-10-06

## 2023-10-06 RX ORDER — BUPROPION HYDROCHLORIDE 150 MG/1
150 TABLET ORAL DAILY
Qty: 90 TABLET | Refills: 1 | OUTPATIENT
Start: 2023-10-06

## 2023-10-06 RX ORDER — ZOLPIDEM TARTRATE 10 MG/1
10 TABLET ORAL NIGHTLY
Qty: 10 TABLET | Refills: 0 | Status: SHIPPED | OUTPATIENT
Start: 2023-10-06

## 2023-10-06 NOTE — TELEPHONE ENCOUNTER
Patient called there was an issue with prescription not getting filled. Told her with that type of drug needs an appt. She scheduled an appt on 10/13/2023 with Loretta Faustin but she said she is running out.

## 2023-10-06 NOTE — TELEPHONE ENCOUNTER
Refill request for:    Requested Prescriptions     Pending Prescriptions Disp Refills    ZOLPIDEM 10 MG Oral Tab [Pharmacy Med Name: ZOLPIDEM 10MG TABLETS] 30 tablet 0     Sig: TAKE 1 TABLET(10 MG) BY MOUTH EVERY NIGHT        Last Prescribed Quantity Refills   7/3/23 30 2     LOV 11/9/2022  Tele: 7/3/23    Patient was asked to follow-up in: no follow ups on file. Appointment scheduled: Visit date not found    Medication not on protocol. # 30 with 2 refills routed to Devan Richards Ace [address] for review.

## 2023-10-16 ENCOUNTER — TELEMEDICINE (OUTPATIENT)
Dept: FAMILY MEDICINE CLINIC | Facility: CLINIC | Age: 53
End: 2023-10-16
Payer: MEDICAID

## 2023-10-16 DIAGNOSIS — G47.00 INSOMNIA, UNSPECIFIED TYPE: ICD-10-CM

## 2023-10-16 DIAGNOSIS — F41.9 ANXIETY: ICD-10-CM

## 2023-10-16 RX ORDER — ZOLPIDEM TARTRATE 10 MG/1
10 TABLET ORAL NIGHTLY
Qty: 90 TABLET | Refills: 1 | Status: SHIPPED | OUTPATIENT
Start: 2023-10-16

## 2023-10-16 RX ORDER — ALPRAZOLAM 0.5 MG/1
0.5 TABLET ORAL 2 TIMES DAILY PRN
Qty: 30 TABLET | Refills: 0 | Status: SHIPPED | OUTPATIENT
Start: 2023-10-16

## 2023-10-16 NOTE — PROGRESS NOTES
Patient presents with:  Medication Follow-Up     Michael Pereira is a 46year old female with PMH anxiety, insomnia who presents via video encounter for medication refill. HPI:   Insomnia - on Ambien. Longstanding issues sleeping. The Ambien helps. She has been on this for \"a while\" - estimates on and off for about 5 yrs. Takes this most evenings. Takes the 10mg. Tolerating the med well. Anxiety - on Wellbutrin and PRN alprazolam.  H/o negative relationship, and will have panic attacks at times. This is generally improving. She has used more alprazolam in the last month due to a court hearing on the individual in the relationship. Under normal circumstances, takes it 0-2 times a week. REVIEW OF SYSTEMS:  Pertinent items are noted in HPI. Physical Exam:  alert, appears stated age, and cooperative, Speaking in full sentences comfortably, and Normal work of breathing        Assessment and Plan  Diagnoses and all orders for this visit:    Insomnia, unspecified type  -     zolpidem 10 MG Oral Tab; Take 1 tablet (10 mg total) by mouth nightly. Anxiety  -     ALPRAZolam 0.5 MG Oral Tab; Take 1 tablet (0.5 mg total) by mouth 2 (two) times daily as needed. Medications refilled  Discussed avoiding taking together or with alcohol  [place]. Use stable. This visit is conducted using Telemedicine with live, interactive video and audio. Patient was in [place] at the time of the encounter.     Return end of this year, early next year, for Annual Gabe Jimenez M.D.   [facility] 3  10/16/23

## 2023-11-01 ENCOUNTER — TELEPHONE (OUTPATIENT)
Dept: FAMILY MEDICINE CLINIC | Facility: CLINIC | Age: 53
End: 2023-11-01

## 2023-11-01 NOTE — TELEPHONE ENCOUNTER
Pt is calling she is asking for her appt on 10/16/23 to be resubmitted to [employer] and Medicaid because it was only sent to Medicaid and they did not cover the video call/telemedicine. Please re-bill    Lm for patient to let her know that we only have straight Medicaid on her file and to call back with the additional insurance she stated she has.

## 2023-11-09 ENCOUNTER — HOSPITAL ENCOUNTER (OUTPATIENT)
Dept: MAMMOGRAPHY | Facility: HOSPITAL | Age: 53
Discharge: HOME | End: 2023-11-09
Attending: FAMILY MEDICINE
Payer: COMMERCIAL

## 2023-11-09 DIAGNOSIS — Z12.31 SCREENING MAMMOGRAM FOR BREAST CANCER: ICD-10-CM

## 2023-11-09 PROCEDURE — 77067 SCR MAMMO BI INCL CAD: CPT | Performed by: FAMILY MEDICINE

## 2023-11-09 PROCEDURE — 77063 BREAST TOMOSYNTHESIS BI: CPT | Performed by: FAMILY MEDICINE

## 2023-11-20 NOTE — TELEPHONE ENCOUNTER
LM for patient to schedule her physical with Dr. Delfina Barajas or a mid level.
LOV 1/17/22 -acute televisit. Last refill was 1/4/22 for 6 months. Pt states she takes continuous pill and out. Last physical 11/30/20  Left message for Pt to call back- needs physical appointment.    1 refill sent- please assist in scheduling physical appointment
Patient is completely out she takes this continuous she did have a appointment 02/22 with Josemanuel De La Cruz FE 1/20 1-20 MG-MCG Oral Tab
Please keep this encounter with the front staff until appt is scheduled
Spoke to pt and she was unavailable to schedule an appointment today and she'll call back to schedule a physical.
0 = understands/communicates without difficulty

## 2023-11-27 DIAGNOSIS — F41.9 ANXIETY: ICD-10-CM

## 2023-11-28 ENCOUNTER — TELEPHONE (OUTPATIENT)
Dept: FAMILY MEDICINE CLINIC | Facility: CLINIC | Age: 53
End: 2023-11-28

## 2023-11-28 DIAGNOSIS — Z12.31 ENCOUNTER FOR SCREENING MAMMOGRAM FOR MALIGNANT NEOPLASM OF BREAST: ICD-10-CM

## 2023-11-28 DIAGNOSIS — Z13.0 SCREENING FOR BLOOD DISEASE: ICD-10-CM

## 2023-11-28 DIAGNOSIS — Z13.228 SCREENING FOR METABOLIC DISORDER: ICD-10-CM

## 2023-11-28 DIAGNOSIS — Z13.220 SCREENING FOR LIPID DISORDERS: ICD-10-CM

## 2023-11-28 DIAGNOSIS — Z13.29 SCREENING FOR THYROID DISORDER: ICD-10-CM

## 2023-11-28 RX ORDER — TRIAMCINOLONE ACETONIDE 1 MG/G
CREAM TOPICAL
COMMUNITY
Start: 2023-11-14

## 2023-11-28 RX ORDER — NITROFURANTOIN 25; 75 MG/1; MG/1
100 CAPSULE ORAL AS NEEDED
COMMUNITY
Start: 2023-11-14

## 2023-11-28 NOTE — TELEPHONE ENCOUNTER
No.  Needs visit to discuss anxiety. We discussed her taking 0-2 tabs a week. Now 4 weeks later already out of 30? PCP walker.

## 2023-11-28 NOTE — TELEPHONE ENCOUNTER
Refill request for:    Requested Prescriptions     Pending Prescriptions Disp Refills    ALPRAZOLAM 0.5 MG Oral Tab [Pharmacy Med Name: ALPRAZOLAM 0.5MG TABLETS] 30 tablet 0     Sig: TAKE 1 TABLET(0.5 MG) BY MOUTH TWICE DAILY AS NEEDED        Last Prescribed Quantity Refills   10/16/23 30 0     LOV Visit date not found   Telemedicine 10/16/23 with Ghazal Ly  In office 11/09/22    Patient was asked to follow-up in: end of year early next year. Appointment scheduled: 12/18/2023 Kevyn Nuñez NP    Medication not on protocol.      Routed to Dinorah Gomes MD for review

## 2023-11-28 NOTE — TELEPHONE ENCOUNTER
Please enter lab orders for the patient's upcoming physical appointment. Physical scheduled: Your appointments       Date & Time Appointment Department [facility])    Dec 18, 2023  3:30 PM CST Physical - Established with Carter Nicolas NP [facility], [address], [place] ([address])              [address] [place] [address] [id number]           Preferred lab: [facility])     The patient has been notified to complete fasting labs prior to their physical appointment.

## 2023-11-28 NOTE — TELEPHONE ENCOUNTER
I'm listed as PCP, but I have never seen her. You had televisit with her. Do you feel comfortable refilling? Otherwise she is seeing Kristy Fox next month.

## 2023-11-29 RX ORDER — ALPRAZOLAM 0.5 MG/1
0.5 TABLET ORAL 2 TIMES DAILY PRN
Qty: 5 TABLET | Refills: 0 | Status: SHIPPED | OUTPATIENT
Start: 2023-11-29

## 2023-11-29 NOTE — TELEPHONE ENCOUNTER
Pt is calling back and she said she would like to have 10 pills at least to get to appointment she wants to be safe rather than sorry. Please send. Appt with Juanpablo Dunaway is scheduled for 12/18/23.

## 2023-11-29 NOTE — TELEPHONE ENCOUNTER
Can send in 5 tabs. Controlled med, needs to get to appt. Significant increase in use of the med, not great in combination with the zolpidem.

## 2023-12-18 ENCOUNTER — OFFICE VISIT (OUTPATIENT)
Dept: FAMILY MEDICINE CLINIC | Facility: CLINIC | Age: 53
End: 2023-12-18
Payer: COMMERCIAL

## 2023-12-18 DIAGNOSIS — G47.00 INSOMNIA, UNSPECIFIED TYPE: ICD-10-CM

## 2023-12-18 DIAGNOSIS — G47.9 DIFFICULTY SLEEPING: ICD-10-CM

## 2023-12-18 DIAGNOSIS — F41.9 ANXIETY: ICD-10-CM

## 2023-12-18 DIAGNOSIS — Z00.00 ROUTINE PHYSICAL EXAMINATION: Primary | ICD-10-CM

## 2023-12-18 RX ORDER — ZOLPIDEM TARTRATE 10 MG/1
10 TABLET ORAL NIGHTLY PRN
Qty: 30 TABLET | Refills: 2 | Status: SHIPPED | OUTPATIENT
Start: 2024-01-15

## 2023-12-18 RX ORDER — BUPROPION HYDROCHLORIDE 150 MG/1
150 TABLET ORAL DAILY
Qty: 90 TABLET | Refills: 1 | Status: SHIPPED | OUTPATIENT
Start: 2023-12-18

## 2023-12-18 NOTE — PROGRESS NOTES
Raquel Valdovinos is a 46year old female who presents for a complete physical exam:       Patient has no acute complaints. Has prior history of of difficulty sleeping/insomnia and anxiety. Manages insomnia with zolpidem, feels this works well, she sleeps well and wakes feeling rested. Reports takes this every night. Denies having \"hang over\" feeling from this. Also, has prior history of anxiety. Continues taking bupropion 150mg daily. Reports continues to do well with her mental health. Reports rarely takes alprazolam (about 3-4 times per month per her report) and does not currently need refills of alprazolam. Denies suicidal/homicidal ideations. Works at [employer], doing traffic and sports, also writing for newspaper. Is not . Working to eat a more healthy diet. Does not exercise routinely. Reports is a non-smoker, drinks 0 alcoholic drinks weekly. Menstrual Cycle: irregular  Pap:  follows with gyne, has history of ASCUS. Mammogram: completed Nov 2023  Colonoscopy: UTD due in 2029.     Wt Readings from Last 6 Encounters:   12/18/23 178 lb (80.7 kg)   11/09/22 180 lb (81.6 kg)   03/24/21 167 lb (75.8 kg)   02/19/21 168 lb (76.2 kg)   11/30/20 160 lb (72.6 kg)   07/27/20 159 lb 8 oz (72.3 kg)       Cholesterol, Total (mg/dL)   Date Value   11/20/2020 198   09/24/2019 189   09/18/2018 193     CHOLESTEROL (mg/dL)   Date Value   08/05/2014 222 (H)   07/27/2012 202 (H)     HDL Cholesterol (mg/dL)   Date Value   11/20/2020 107 (H)   09/24/2019 93 (H)   09/18/2018 108 (H)     HDL CHOL (mg/dL)   Date Value   08/05/2014 148   07/27/2012 130     LDL Cholesterol (mg/dL)   Date Value   11/20/2020 80   09/24/2019 85   09/18/2018 71     LDL CHOLESTROL (mg/dL)   Date Value   08/05/2014 66   07/27/2012 65     AST (U/L)   Date Value   11/20/2020 12 (L)   12/03/2019 16   09/24/2019 14 (L)   08/05/2014 20   07/27/2012 22     ALT (U/L)   Date Value   11/20/2020 17   12/03/2019 23   09/24/2019 17   08/05/2014 25 2012 22        Current Outpatient Medications   Medication Sig Dispense Refill    ALPRAZolam 0.5 MG Oral Tab TAKE 1 TABLET(0.5 MG) BY MOUTH TWICE DAILY AS NEEDED 5 tablet 0    nitrofurantoin monohydrate macro 100 MG Oral Cap Take 1 capsule (100 mg total) by mouth as needed. AFTER SEXUAL ACTIVITY      triamcinolone 0.1 % External Cream APPLY TOPICALLY TO THE AFFECTED AREA DAILY. DO NOT USE ON 1 AREA FOR MORE THAN 2 WEEKS INAROW      zolpidem 10 MG Oral Tab Take 1 tablet (10 mg total) by mouth nightly. 90 tablet 1    buPROPion  MG Oral Tablet 24 Hr Take 1 tablet (150 mg total) by mouth daily. 90 tablet 1    Omega 3-6-9 Fatty Acids (OMEGA-3-6-9 OR) Take by mouth. Alpha-Lipoic Acid 600 MG Oral Tab Take by mouth.      ketoconazole 2 % External Cream Apply 1 Tube topically daily. 1 each 0    tretinoin 0.025 % External Cream APPLY 1 APPLICATION TOPICALLY NIGHTLY. START WITH EVERY THIRD DAY, THEN EVERY OTHER DAY, THEN DAILY. Econazole Nitrate 1 % External Cream Apply to feet twice daily for 3 weeks 85 g 2    Biotin 5000 MCG Oral Cap Take 1 tablet by mouth. Magnesium 500 MG Oral Tab Take by mouth nightly. Calcium Citrate 950 MG Oral Tab Take 1 tablet (950 mg total) by mouth daily. Vitamin C (VITAMIN C) 500 MG Oral Tab Take 1 tablet (500 mg total) by mouth daily. Multiple Vitamins-Minerals (B COMPLEX VITAMINS PLUS) Oral Tab Take 1 tablet by mouth daily. Cholecalciferol (VITAMIN D) 1000 UNITS Oral Tab Take 1 tablet by mouth daily.         Past Medical History:   Diagnosis Date    Visual impairment     glasses      Past Surgical History:   Procedure Laterality Date      2000    ADONIS BIOPSY STEREO NODULE 1 SITE RIGHT (NKK=46532) Right 2018    benign     ADONIS LOCALIZATION WIRE 1 SITE RIGHT (CPT=19281)  10/2018    FEA    TONSILLECTOMY        Family History   Problem Relation Age of Onset    Ovarian Cancer Paternal Grandmother 72        estimate, ? whether it was ovarian or bladder    Cancer Father 61        lung    Other (lung cancer) Father     Cancer Maternal Grandmother         throat    Colon Polyps Mother       Social History     Socioeconomic History    Marital status: Single   Occupational History    Occupation: Radio    Tobacco Use    Smoking status: Former    Smokeless tobacco: Never    Tobacco comments:     quit in 1991   Vaping Use    Vaping Use: Never used   Substance and Sexual Activity    Alcohol use: Yes     Alcohol/week: 1.0 - 2.0 standard drink of alcohol     Types: 1 - 2 Standard drinks or equivalent per week     Comment: 1-2 drinks per week    Drug use: No    Sexual activity: Yes     Partners: Male     Birth control/protection: Condom   Other Topics Concern    Caffeine Concern Yes     Comment: 1-2 cups of coffee daily    Exercise Yes     Comment: 3-4 days a week    Seat Belt Yes    Self-Exams No     Social History: as above     Health Maintenance  Immunizations: Recommend flu vaccine for 2709-5104 flu season. Immunization History   Administered Date(s) Administered    TDAP 09/26/2016     Dental Visits: Regularly. REVIEW OF SYSTEMS:   GENERAL: feels well otherwise. No fever, chills, malaise  SKIN: denies any unusual skin lesions, rash or pruritis  EYES: denies blurred/double vision, light sensitivity or visual disturbances  HEENT: denies nasal congestion, sinus pain/tenderness. Denies neck stiffness. BREAST: denies pain, dimpling, nipple discharge  LUNGS: denies dyspnea, wheezing, SOB, JEFFREY, cough  CARDIOVASCULAR: denies chest pain on exertion, palpitations, edema, orthopnea  GI: denies abdominal pain, heartburn, diarrhea or constipation  : denies dysuria, frequency, urgency, hematuria, vaginal discharge or itching. MUSCULOSKELETAL: denies back pain  NEURO: denies headaches, dizziness, syncope    EXAM:   There were no vitals taken for this visit. There is no height or weight on file to calculate BMI.    GENERAL: well developed, well nourished,in no apparent distress  SKIN: Skin warm/dry. Color appropriate for ethnicity. No rashes, suspicious lesions. HEENT: atraumatic, normocephalic, ears are clear. EYES: PERRLA, EOMI. Conjunctiva are clear. Sclera white  NECK: supple w/o masses/tenderness/ adenopathy, no bruits/JVD, Thyroid symmetrical w/o  enlargement  CHEST: no chest tenderness over ribs or bony prominences. BREAST: deferred  LUNGS: clear to auscultation bilaterally. Symmetrical expansion during respirations. CARDIO: RRR without murmurs  GI: Soft, non-tender/non-distended, BS(+)x4, no masses, HSM or CVA tenderness. : deferred  MUSCULOSKELETAL: muscle strength grade 5 to all extremities, back non-tender. EXTREMITIES: no edema, full ROM to all extremities. Patellar DTR 2+ bilat  NEURO: A/Ox3, cranial nerves II-XII intact, motor/sensory function grossly intact. ASSESSMENT AND PLAN:      HEALTH MAINTENANCE:      Encounter Diagnoses   Name Primary? Routine physical examination- adult female in her usual state of health. Discussed importance of getting routine exercise for at least 30 minutes daily and making healthy diet choices. Asked to complete screening labs as previously ordered. Yes    Difficulty sleeping- Stable. Continue current management, refills provided. IL- checked prior to prescribing, no suspicious activity noted. Insomnia, unspecified type- Stable. Continue current management, refills provided. IL- checked prior to prescribing, no suspicious activity noted. Anxiety- Stable. Continue current management, refills of bupropion provided. Stated does not currently need refills of alprazolam.           No orders of the defined types were placed in this encounter. Meds & Refills for this Visit:  Requested Prescriptions      No prescriptions requested or ordered in this encounter       Imaging & Consults:  None    No follow-ups on file.   There are no Patient Instructions on file for this visit.

## 2023-12-18 NOTE — PATIENT INSTRUCTIONS
Please complete blood work as ordered. Do blood work fasting- no food or drink except for plain water- for 8 hours prior to testing. Ideally, labs would be done early in the morning. You can call [phone] to schedule or it can be scheduled via the [employer] [facility].

## 2023-12-21 DIAGNOSIS — F41.9 ANXIETY: ICD-10-CM

## 2023-12-21 RX ORDER — ALPRAZOLAM 0.5 MG/1
0.5 TABLET ORAL 2 TIMES DAILY PRN
Qty: 30 TABLET | Refills: 0 | Status: SHIPPED | OUTPATIENT
Start: 2023-12-21

## 2023-12-21 NOTE — TELEPHONE ENCOUNTER
Refill request for:    Requested Prescriptions     Pending Prescriptions Disp Refills    ALPRAZOLAM 0.5 MG Oral Tab [Pharmacy Med Name: ALPRAZOLAM 0.5MG TABLETS] 30 tablet 0     Sig: TAKE 1 TABLET(0.5 MG) BY MOUTH TWICE DAILY AS NEEDED FOR ANXIETY OR SLEEP        Last Prescribed Quantity Refills   11/29/23 5 0     LOV 12/18/2023     Patient was asked to follow-up in: no follow ups on file. Appointment scheduled: Visit date not found    Medication not on protocol.      # 5 with 0 refills routed to HERI Duarte for review

## 2023-12-21 NOTE — TELEPHONE ENCOUNTER
Pt call requesting her medication today because is out of med .     Patient have appt with  Francella Snellen 2 days ago       ALPRAZolam 0.5 MG Oral Tab 5 tablet 0         [place] [id number] [id number] [facility] AT [facility] OF [facility] [address], [phone], [phone] [address] [place] [id number] Phone: [phone] Fax: [phone] Hours: Not open 24 hours

## 2023-12-22 ENCOUNTER — TELEPHONE (OUTPATIENT)
Dept: FAMILY MEDICINE CLINIC | Facility: CLINIC | Age: 53
End: 2023-12-22

## 2023-12-22 NOTE — TELEPHONE ENCOUNTER
This message is confusing, saw her this week, no issues with abd concerns. Where did she have surgery, what date? We need records sent to us please.

## 2023-12-22 NOTE — TELEPHONE ENCOUNTER
Pt call for a condition update they took out my appendix and I feel fine please can you tell Kevyn Grimaldo everything is ok.

## 2024-02-27 DIAGNOSIS — F41.9 ANXIETY: ICD-10-CM

## 2024-02-28 RX ORDER — ALPRAZOLAM 0.5 MG/1
0.5 TABLET ORAL 2 TIMES DAILY PRN
Qty: 30 TABLET | Refills: 1 | Status: SHIPPED | OUTPATIENT
Start: 2024-02-28

## 2024-02-28 NOTE — TELEPHONE ENCOUNTER
Refill request for:    Requested Prescriptions     Pending Prescriptions Disp Refills    ALPRAZOLAM 0.5 MG Oral Tab [Pharmacy Med Name: ALPRAZOLAM 0.5MG TABLETS] 30 tablet 0     Sig: TAKE 1 TABLET(0.5 MG) BY MOUTH TWICE DAILY AS NEEDED        Last Prescribed Quantity Refills   12/21/23 30 0     LOV 12/18/2023     Patient was asked to follow-up in: no follow ups on file.    Appointment scheduled: Visit date not found    Medication not on protocol.     # 30 with 0 refills routed to HERI Millan for review

## 2024-03-07 NOTE — TELEPHONE ENCOUNTER
-On the morning of your surgery you may take your Metoprolol (lopressor) 50mg. Do not take any of your other medications.  -The day before your surgery do not take your nightly metformin dose  -The night before your surgery only take 10units of your lantus  -You may resume your medications as you would normally take them after your surgery    Requested Prescriptions     Pending Prescriptions Disp Refills   • JUNEL FE 1/20 1-20 MG-MCG Oral Tab [Pharmacy Med Name: JUNEL FE 1 MG-20 MCG TABLET] 84 tablet 3     Sig: TAKE 1 TABLET BY MOUTH EVERY DAY     LOV 7/27/2020         Appointment scheduled: Vi

## 2024-04-09 DIAGNOSIS — G47.9 DIFFICULTY SLEEPING: ICD-10-CM

## 2024-04-09 DIAGNOSIS — G47.00 INSOMNIA, UNSPECIFIED TYPE: ICD-10-CM

## 2024-04-09 RX ORDER — ZOLPIDEM TARTRATE 10 MG/1
10 TABLET ORAL NIGHTLY PRN
Qty: 30 TABLET | Refills: 2 | Status: SHIPPED | OUTPATIENT
Start: 2024-04-09

## 2024-06-03 DIAGNOSIS — F41.9 ANXIETY: ICD-10-CM

## 2024-06-04 RX ORDER — ALPRAZOLAM 0.5 MG/1
0.5 TABLET ORAL 2 TIMES DAILY PRN
Qty: 30 TABLET | Refills: 0 | OUTPATIENT
Start: 2024-06-04

## 2024-06-04 NOTE — TELEPHONE ENCOUNTER
Refill request for:    Requested Prescriptions     Pending Prescriptions Disp Refills    ALPRAZOLAM 0.5 MG Oral Tab [Pharmacy Med Name: ALPRAZOLAM 0.5MG TABLETS] 30 tablet 0     Sig: TAKE 1 TABLET(0.5 MG) BY MOUTH TWICE DAILY AS NEEDED        Last Prescribed Quantity Refills   2/28/24 30 1     LOV 12/18/2023     Patient was asked to follow-up in: Follow-up not documented in note    Appointment scheduled: 6/5/2024 Natan Gatica NP    Medication not on protocol.     # 30 with 1 refills routed to HERI Millan for review

## 2024-06-05 ENCOUNTER — TELEMEDICINE (OUTPATIENT)
Dept: FAMILY MEDICINE CLINIC | Facility: CLINIC | Age: 54
End: 2024-06-05
Payer: COMMERCIAL

## 2024-06-05 DIAGNOSIS — G47.00 INSOMNIA, UNSPECIFIED TYPE: Primary | ICD-10-CM

## 2024-06-05 DIAGNOSIS — F41.9 ANXIETY: ICD-10-CM

## 2024-06-05 PROCEDURE — 99214 OFFICE O/P EST MOD 30 MIN: CPT | Performed by: NURSE PRACTITIONER

## 2024-06-05 RX ORDER — BUPROPION HYDROCHLORIDE 150 MG/1
150 TABLET ORAL DAILY
Qty: 90 TABLET | Refills: 1 | Status: SHIPPED | OUTPATIENT
Start: 2024-06-05

## 2024-06-05 RX ORDER — ZOLPIDEM TARTRATE 10 MG/1
10 TABLET ORAL NIGHTLY PRN
Qty: 90 TABLET | Refills: 0 | Status: SHIPPED | OUTPATIENT
Start: 2024-06-05

## 2024-06-05 RX ORDER — ALPRAZOLAM 0.5 MG/1
0.5 TABLET ORAL 2 TIMES DAILY PRN
Qty: 30 TABLET | Refills: 1 | Status: SHIPPED | OUTPATIENT
Start: 2024-06-05

## 2024-06-05 NOTE — PROGRESS NOTES
Chief Complaint   Patient presents with    Medication Follow-Up       This visit was conducted using Telemedicine with live, two-way interactive video and audio.    Patient understands and accepts financial responsibility for any deductible, co-insurance and/or co-pays associated with this service.    HPI:  Presents for follow up of difficulty sleeping/insomnia and anxiety. Manages insomnia with zolpidem, feels this works well, she sleeps well and wakes feeling rested. Reports takes this every night. Denies having \"hang over\" feeling from this. Continues taking bupropion 150mg daily for management of anxiety. Feels this is still working well/ Uses alprazolam for anxiety exacerbation, reports about 1-2 times per week. Denies panic attacks, mood swings, suicidal/homicidal ideations.     Review of Systems   As noted in HPI    Past Medical History:    Visual impairment    glasses       Patient Active Problem List   Diagnosis    Positive ESTEFANI (antinuclear antibody)    Insomnia    Premenstrual tension    Anxiety    Difficulty sleeping    Atypical squamous cell changes of undetermined significance (ASCUS) on vaginal cytology    Episodic mood disorder (HCC)    Recurrent UTI       Current Outpatient Medications   Medication Sig Dispense Refill    buPROPion  MG Oral Tablet 24 Hr Take 1 tablet (150 mg total) by mouth daily. 90 tablet 1    ALPRAZolam 0.5 MG Oral Tab Take 1 tablet (0.5 mg total) by mouth 2 (two) times daily as needed. 30 tablet 1    zolpidem 10 MG Oral Tab Take 1 tablet (10 mg total) by mouth nightly as needed for Sleep. 90 tablet 0    nitrofurantoin monohydrate macro 100 MG Oral Cap Take 1 capsule (100 mg total) by mouth as needed. AFTER SEXUAL ACTIVITY      triamcinolone 0.1 % External Cream APPLY TOPICALLY TO THE AFFECTED AREA DAILY. DO NOT USE ON 1 AREA FOR MORE THAN 2 WEEKS INAROW      Omega 3-6-9 Fatty Acids (OMEGA-3-6-9 OR) Take by mouth.      Alpha-Lipoic Acid 600 MG Oral Tab Take by mouth.       ketoconazole 2 % External Cream Apply 1 Tube topically daily. 1 each 0    tretinoin 0.025 % External Cream APPLY 1 APPLICATION TOPICALLY NIGHTLY. START WITH EVERY THIRD DAY, THEN EVERY OTHER DAY, THEN DAILY.      Econazole Nitrate 1 % External Cream Apply to feet twice daily for 3 weeks 85 g 2    Biotin 5000 MCG Oral Cap Take 1 tablet by mouth.      Magnesium 500 MG Oral Tab Take by mouth nightly.      Calcium Citrate 950 MG Oral Tab Take 1 tablet (950 mg total) by mouth daily.      Vitamin C (VITAMIN C) 500 MG Oral Tab Take 1 tablet (500 mg total) by mouth daily.      Multiple Vitamins-Minerals (B COMPLEX VITAMINS PLUS) Oral Tab Take 1 tablet by mouth daily.      Cholecalciferol (VITAMIN D) 1000 UNITS Oral Tab Take 1 tablet by mouth daily.         Physical Exam  General:  Sitting calmly on screen, non-toxic appearing, no apparent distress.      Neuro: AOx3 on video, answering questions and interacting appropriately.    Resp: breathing non-labored, speaking in full sentences. No need to pause speaking to take breaths, no deep/exaggerated breaths or coughing noted during conversation.       Skin: warm and pink w/o flushing or obvious diaphoresis     Psych: pleasant and cooperative on video, speech pattern normal.     A/P:    Encounter Diagnoses   Name Primary?    Insomnia, unspecified type- Stable. Continue current management, refills provided. IL- checked prior to prescribing, no suspicious activity noted.    Yes    Anxiety- Stable. Continue current management, refills provided. IL- checked prior to prescribing, no suspicious activity noted.             No orders of the defined types were placed in this encounter.      Meds & Refills for this Visit:  Requested Prescriptions     Signed Prescriptions Disp Refills    buPROPion  MG Oral Tablet 24 Hr 90 tablet 1     Sig: Take 1 tablet (150 mg total) by mouth daily.    ALPRAZolam 0.5 MG Oral Tab 30 tablet 1     Sig: Take 1 tablet (0.5 mg total) by mouth 2  (two) times daily as needed.    zolpidem 10 MG Oral Tab 90 tablet 0     Sig: Take 1 tablet (10 mg total) by mouth nightly as needed for Sleep.       Imaging & Consults:  None    No follow-ups on file.  There are no Patient Instructions on file for this visit.    All questions were answered and the patient understands the plan.

## 2024-09-03 DIAGNOSIS — F41.9 ANXIETY: ICD-10-CM

## 2024-09-03 NOTE — TELEPHONE ENCOUNTER
Refill request for:    Requested Prescriptions     Pending Prescriptions Disp Refills    ALPRAZOLAM 0.5 MG Oral Tab [Pharmacy Med Name: ALPRAZOLAM 0.5MG TABLETS] 30 tablet 0     Sig: TAKE 1 TABLET(0.5 MG) BY MOUTH TWICE DAILY AS NEEDED        Last Prescribed Quantity Refills   6/5/24 30 1     LOV 12/18/2023 Tele: 6/5/24    Patient was asked to follow-up in: Follow-up not documented in note    Appointment scheduled: Visit date not found    Medication not on protocol.     # 30 with 1 refills routed to HERI Millan for review

## 2024-09-04 RX ORDER — ALPRAZOLAM 0.5 MG
0.5 TABLET ORAL 2 TIMES DAILY PRN
Qty: 30 TABLET | Refills: 0 | Status: SHIPPED | OUTPATIENT
Start: 2024-09-04

## 2024-10-03 DIAGNOSIS — G47.00 INSOMNIA, UNSPECIFIED TYPE: ICD-10-CM

## 2024-10-03 RX ORDER — ZOLPIDEM TARTRATE 10 MG/1
10 TABLET ORAL NIGHTLY PRN
Qty: 30 TABLET | Refills: 0 | Status: SHIPPED | OUTPATIENT
Start: 2024-10-03

## 2024-10-03 NOTE — TELEPHONE ENCOUNTER
Patient called and scheduled an appointment for medication follow up with Natan Gatica and his first opening was on 10/08/2024. Patient will be out of medication in two days can we do a short term refill until appointment     zolpidem 10 MG Oral Tab     [facility] #[id number] - [place], [place] - [address] [place] [address] AT [facility]

## 2024-10-08 ENCOUNTER — TELEMEDICINE (OUTPATIENT)
Dept: FAMILY MEDICINE CLINIC | Facility: CLINIC | Age: 54
End: 2024-10-08
Payer: COMMERCIAL

## 2024-10-08 DIAGNOSIS — G47.00 INSOMNIA, UNSPECIFIED TYPE: Primary | ICD-10-CM

## 2024-10-08 DIAGNOSIS — F41.9 ANXIETY: ICD-10-CM

## 2024-10-08 PROCEDURE — 99214 OFFICE O/P EST MOD 30 MIN: CPT | Performed by: NURSE PRACTITIONER

## 2024-10-08 RX ORDER — ALPRAZOLAM 0.5 MG
0.5 TABLET ORAL 2 TIMES DAILY PRN
Qty: 30 TABLET | Refills: 0 | Status: SHIPPED | OUTPATIENT
Start: 2024-10-08

## 2024-10-08 RX ORDER — BUPROPION HYDROCHLORIDE 150 MG/1
150 TABLET ORAL DAILY
Qty: 90 TABLET | Refills: 1 | Status: SHIPPED | OUTPATIENT
Start: 2024-10-08

## 2024-10-08 RX ORDER — ZOLPIDEM TARTRATE 10 MG/1
10 TABLET ORAL NIGHTLY PRN
Qty: 30 TABLET | Refills: 1 | Status: SHIPPED | OUTPATIENT
Start: 2024-11-01

## 2024-10-08 NOTE — PROGRESS NOTES
Chief Complaint   Patient presents with    Insomnia       This visit was conducted using Telemedicine with live, two-way interactive video and audio.    Patient understands and accepts financial responsibility for any deductible, co-insurance and/or co-pays associated with this service.    HPI:  Presents for follow up of difficulty sleeping/insomnia and anxiety. Manages insomnia with zolpidem, feels this works well. Reports takes this medication every night. Reports she sleeps well and wakes feeling rested with medication. Denies having \"hang over\" feeling from this. Continues taking bupropion 150mg daily for management of anxiety. Feels this is still working well. Still uses alprazolam for anxiety exacerbation, reports about 0-3 times per week. Denies panic attacks, mood swings, suicidal/homicidal ideations.  Alprazolam last prescribed 9/4/24 for 30 tabs, reports she still have about 10 tabs left. Zolpidem prescribed on 10/3 but per [place]  review not filled since 9/3/24, although patient reports she did fill it.       Review of Systems   As noted in HPI    Past Medical History:    Visual impairment    glasses       Patient Active Problem List   Diagnosis    Positive ESTEFANI (antinuclear antibody)    Insomnia    Premenstrual tension    Anxiety    Difficulty sleeping    Atypical squamous cell changes of undetermined significance (ASCUS) on vaginal cytology    Episodic mood disorder (HCC)    Recurrent UTI       Current Outpatient Medications   Medication Sig Dispense Refill    zolpidem 10 MG Oral Tab Take 1 tablet (10 mg total) by mouth nightly as needed for Sleep. 30 tablet 0    ALPRAZOLAM 0.5 MG Oral Tab TAKE 1 TABLET(0.5 MG) BY MOUTH TWICE DAILY AS NEEDED 30 tablet 0    buPROPion  MG Oral Tablet 24 Hr Take 1 tablet (150 mg total) by mouth daily. 90 tablet 1    nitrofurantoin monohydrate macro 100 MG Oral Cap Take 1 capsule (100 mg total) by mouth as needed. AFTER SEXUAL ACTIVITY      triamcinolone 0.1 % External  Cream APPLY TOPICALLY TO THE AFFECTED AREA DAILY. DO NOT USE ON 1 AREA FOR MORE THAN 2 WEEKS INAROW      Omega 3-6-9 Fatty Acids (OMEGA-3-6-9 OR) Take by mouth.      Alpha-Lipoic Acid 600 MG Oral Tab Take by mouth.      ketoconazole 2 % External Cream Apply 1 Tube topically daily. 1 each 0    tretinoin 0.025 % External Cream APPLY 1 APPLICATION TOPICALLY NIGHTLY. START WITH EVERY THIRD DAY, THEN EVERY OTHER DAY, THEN DAILY.      Econazole Nitrate 1 % External Cream Apply to feet twice daily for 3 weeks 85 g 2    Biotin 5000 MCG Oral Cap Take 1 tablet by mouth.      Magnesium 500 MG Oral Tab Take by mouth nightly.      Calcium Citrate 950 MG Oral Tab Take 1 tablet (950 mg total) by mouth daily.      Vitamin C (VITAMIN C) 500 MG Oral Tab Take 1 tablet (500 mg total) by mouth daily.      Multiple Vitamins-Minerals (B COMPLEX VITAMINS PLUS) Oral Tab Take 1 tablet by mouth daily.      Cholecalciferol (VITAMIN D) 1000 UNITS Oral Tab Take 1 tablet by mouth daily.         Physical Exam  General:  Sitting calmly on screen, non-toxic appearing, no apparent distress.      Neuro: AOx3 on video, answering questions and interacting appropriately.    Resp: breathing non-labored, speaking in full sentences. No need to pause speaking to take breaths, no deep/exaggerated breaths or coughing noted during conversation.       Skin: warm and pink w/o flushing or obvious diaphoresis     Psych: pleasant and cooperative on video, speech pattern normal.     A/P:    Encounter Diagnoses   Name Primary?    Insomnia, unspecified type- Stable. Continue current management, refills provided, future dated appropriately. IL- checked prior to prescribing, no suspicious activity noted.    Yes    Anxiety- Stable. Continue current management, refills provided. IL- checked prior to prescribing, no suspicious activity noted.             No orders of the defined types were placed in this encounter.      Meds & Refills for this Visit:  Requested  Prescriptions      No prescriptions requested or ordered in this encounter       Imaging & Consults:  None    No follow-ups on file.  There are no Patient Instructions on file for this visit.    All questions were answered and the patient understands the plan.

## 2024-10-21 ENCOUNTER — ORDER TRANSCRIPTION (OUTPATIENT)
Dept: ADMINISTRATIVE | Facility: HOSPITAL | Age: 54
End: 2024-10-21

## 2024-10-21 DIAGNOSIS — Z12.31 ENCOUNTER FOR SCREENING MAMMOGRAM FOR MALIGNANT NEOPLASM OF BREAST: Primary | ICD-10-CM

## 2024-11-11 ENCOUNTER — TELEPHONE (OUTPATIENT)
Dept: FAMILY MEDICINE CLINIC | Facility: CLINIC | Age: 54
End: 2024-11-11

## 2024-11-11 DIAGNOSIS — Z12.31 ENCOUNTER FOR SCREENING MAMMOGRAM FOR MALIGNANT NEOPLASM OF BREAST: Primary | ICD-10-CM

## 2024-11-11 NOTE — TELEPHONE ENCOUNTER
Patient is requesting an order for her annual screening mammogram.    Patient has been notified to allow 2-3 business days for order placement. Reviewed with patient that she may schedule mammogram via [facility] or by calling Central Sched[facility] at that time.    Request for mammogram order routed to triage.         Patient has it scheduled with [facility] but needs order

## 2024-11-27 ENCOUNTER — HOSPITAL ENCOUNTER (OUTPATIENT)
Dept: MAMMOGRAPHY | Facility: HOSPITAL | Age: 54
Discharge: HOME | End: 2024-11-27
Attending: FAMILY MEDICINE
Payer: COMMERCIAL

## 2024-11-27 DIAGNOSIS — Z12.31 ENCOUNTER FOR SCREENING MAMMOGRAM FOR MALIGNANT NEOPLASM OF BREAST: ICD-10-CM

## 2024-11-27 PROCEDURE — 77063 BREAST TOMOSYNTHESIS BI: CPT | Performed by: FAMILY MEDICINE

## 2024-11-27 PROCEDURE — 77067 SCR MAMMO BI INCL CAD: CPT | Performed by: FAMILY MEDICINE

## 2024-11-30 ENCOUNTER — TELEPHONE (OUTPATIENT)
Dept: FAMILY MEDICINE CLINIC | Facility: CLINIC | Age: 54
End: 2024-11-30

## 2024-11-30 NOTE — TELEPHONE ENCOUNTER
Called pharmacy and the patient requested this too soon she can pick it up 12/2/24 notified patient of this she will get it then.

## 2024-11-30 NOTE — TELEPHONE ENCOUNTER
Pt is calling  because [facility] says they need approval from the doctor to fill her   zolpidem 10 MG Oral Tab

## 2024-12-06 ENCOUNTER — HOSPITAL ENCOUNTER (OUTPATIENT)
Dept: MAMMOGRAPHY | Age: 54
Discharge: HOME | End: 2024-12-06
Attending: FAMILY MEDICINE
Payer: COMMERCIAL

## 2024-12-06 ENCOUNTER — TELEPHONE (OUTPATIENT)
Dept: FAMILY MEDICINE CLINIC | Facility: CLINIC | Age: 54
End: 2024-12-06

## 2024-12-06 ENCOUNTER — HOSPITAL ENCOUNTER (OUTPATIENT)
Dept: ULTRASOUND IMAGING | Age: 54
Discharge: HOME | End: 2024-12-06
Attending: FAMILY MEDICINE
Payer: COMMERCIAL

## 2024-12-06 DIAGNOSIS — R92.8 ABNORMAL MAMMOGRAM OF LEFT BREAST: Primary | ICD-10-CM

## 2024-12-06 DIAGNOSIS — R92.2 INCONCLUSIVE MAMMOGRAM: ICD-10-CM

## 2024-12-06 PROCEDURE — 77065 DX MAMMO INCL CAD UNI: CPT | Performed by: FAMILY MEDICINE

## 2024-12-06 PROCEDURE — 76642 ULTRASOUND BREAST LIMITED: CPT | Performed by: FAMILY MEDICINE

## 2024-12-06 PROCEDURE — 77061 BREAST TOMOSYNTHESIS UNI: CPT | Performed by: FAMILY MEDICINE

## 2024-12-06 NOTE — TELEPHONE ENCOUNTER
Bebe from Radiology is calling because the MRI needs to be bilateral for both breasts so they need the order updated

## 2024-12-06 NOTE — TELEPHONE ENCOUNTER
Patient is requesting a call back from a nurse.  Based off of her Mammogram results, she needs a MRI of her left breast ordered from Dr. Fields.  Please advise.

## 2024-12-13 ENCOUNTER — TELEPHONE (OUTPATIENT)
Dept: FAMILY MEDICINE CLINIC | Facility: CLINIC | Age: 54
End: 2024-12-13

## 2024-12-18 ENCOUNTER — TELEPHONE (OUTPATIENT)
Dept: FAMILY MEDICINE CLINIC | Facility: CLINIC | Age: 54
End: 2024-12-18

## 2024-12-23 ENCOUNTER — HOSPITAL ENCOUNTER (OUTPATIENT)
Dept: MRI IMAGING | Facility: HOSPITAL | Age: 54
Discharge: HOME | End: 2024-12-23
Attending: NURSE PRACTITIONER
Payer: COMMERCIAL

## 2024-12-23 DIAGNOSIS — R92.8 ABNORMAL MAMMOGRAM OF LEFT BREAST: ICD-10-CM

## 2024-12-23 DIAGNOSIS — F41.9 ANXIETY: ICD-10-CM

## 2024-12-23 PROCEDURE — A9575 INJ GADOTERATE MEGLUMI 0.1ML: HCPCS | Performed by: NURSE PRACTITIONER

## 2024-12-23 PROCEDURE — 77049 MRI BREAST C-+ W/CAD BI: CPT | Performed by: NURSE PRACTITIONER

## 2024-12-23 RX ORDER — GADOTERATE MEGLUMINE 376.9 MG/ML
15 INJECTION INTRAVENOUS
Status: COMPLETED | OUTPATIENT
Start: 2024-12-23 | End: 2024-12-23

## 2024-12-23 RX ORDER — ALPRAZOLAM 0.5 MG
0.5 TABLET ORAL 2 TIMES DAILY PRN
Qty: 30 TABLET | Refills: 0 | Status: SHIPPED | OUTPATIENT
Start: 2024-12-23

## 2024-12-23 RX ADMIN — GADOTERATE MEGLUMINE 14 ML: 376.9 INJECTION INTRAVENOUS at 09:00:00

## 2024-12-23 NOTE — TELEPHONE ENCOUNTER
Refill request for:    Requested Prescriptions     Pending Prescriptions Disp Refills    ALPRAZOLAM 0.5 MG Oral Tab [Pharmacy Med Name: ALPRAZOLAM 0.5MG TABLETS] 30 tablet 0     Sig: TAKE 1 TABLET(0.5 MG) BY MOUTH TWICE DAILY AS NEEDED        Last Prescribed Quantity Refills   10/8/24 30 0     LOV 12/18/23 Tele: 10/8/24    Patient was asked to follow-up in: Follow-up not documented in note    Appointment scheduled: Visit date not found    Medication not on protocol.     # 30 with 0 refills routed to HERI Millan for review

## 2025-01-02 DIAGNOSIS — G47.00 INSOMNIA, UNSPECIFIED TYPE: ICD-10-CM

## 2025-01-02 RX ORDER — ZOLPIDEM TARTRATE 10 MG/1
10 TABLET ORAL NIGHTLY PRN
Qty: 30 TABLET | Refills: 0 | Status: SHIPPED | OUTPATIENT
Start: 2025-01-02

## 2025-01-02 NOTE — TELEPHONE ENCOUNTER
Pt is calling pharmacy is stating they do not have the Zolpidem script. Please call. Pharmacy. Pt is leaving on vacation today.

## 2025-01-02 NOTE — TELEPHONE ENCOUNTER
Requested Prescriptions     Pending Prescriptions Disp Refills    ZOLPIDEM 10 MG Oral Tab [Pharmacy Med Name: ZOLPIDEM 10MG TABLETS] 30 tablet 0     Sig: TAKE 1 TABLET(10 MG) BY MOUTH EVERY NIGHT AS NEEDED FOR SLEEP        Last refill: 11/1/24    Last Appointment: LOV Visit date not found     Next Appointment: Visit date not found

## 2025-01-28 DIAGNOSIS — G47.00 INSOMNIA, UNSPECIFIED TYPE: ICD-10-CM

## 2025-01-28 RX ORDER — ZOLPIDEM TARTRATE 10 MG/1
10 TABLET ORAL NIGHTLY PRN
Qty: 30 TABLET | Refills: 0 | Status: SHIPPED | OUTPATIENT
Start: 2025-01-28

## 2025-01-28 NOTE — TELEPHONE ENCOUNTER
Refill request for:    Requested Prescriptions     Pending Prescriptions Disp Refills    ZOLPIDEM 10 MG Oral Tab [Pharmacy Med Name: ZOLPIDEM 10MG TABLETS] 30 tablet 0     Sig: TAKE 1 TABLET(10 MG) BY MOUTH EVERY NIGHT AS NEEDED FOR SLEEP        Last Prescribed Quantity Refills   1/2/25 30 0     LOV 12/18/23 Tele: 10/8/24    Patient was asked to follow-up in: Follow-up not documented in note    Appointment scheduled: Visit date not found    Medication not on protocol.     Due for appointment  [employer] message sent to schedule one.      # 30 with 0 refills routed to HERI Millan for review

## 2025-01-28 NOTE — TELEPHONE ENCOUNTER
Refill provided, is due for visit for management of controlled substances. Visit will be needed for future refills. Please schedule. Thanks.

## 2025-02-26 ENCOUNTER — TELEPHONE (OUTPATIENT)
Dept: FAMILY MEDICINE CLINIC | Facility: CLINIC | Age: 55
End: 2025-02-26

## 2025-02-26 DIAGNOSIS — F41.9 ANXIETY: ICD-10-CM

## 2025-02-26 NOTE — TELEPHONE ENCOUNTER
Pt is calling because she needs her   ALPRAZolam 0.5 MG Oral Tablet Dispersible     Switched to the   ALPRAZOLAM 0.5 MG Oral Tab

## 2025-02-27 RX ORDER — ALPRAZOLAM 0.5 MG
0.5 TABLET ORAL 2 TIMES DAILY PRN
Qty: 30 TABLET | Refills: 1 | Status: SHIPPED | OUTPATIENT
Start: 2025-02-27

## 2025-03-13 ENCOUNTER — TELEPHONE (OUTPATIENT)
Dept: SURGERY | Facility: CLINIC | Age: 55
End: 2025-03-13

## 2025-03-14 ENCOUNTER — OFFICE VISIT (OUTPATIENT)
Facility: CLINIC | Age: 55
End: 2025-03-14
Payer: COMMERCIAL

## 2025-03-14 VITALS
HEART RATE: 77 BPM | TEMPERATURE: 97 F | RESPIRATION RATE: 16 BRPM | BODY MASS INDEX: 22 KG/M2 | OXYGEN SATURATION: 100 % | SYSTOLIC BLOOD PRESSURE: 133 MMHG | WEIGHT: 149 LBS | DIASTOLIC BLOOD PRESSURE: 85 MMHG

## 2025-03-14 DIAGNOSIS — N60.82 FLAT EPITHELIAL ATYPIA (FEA) OF LEFT BREAST: Primary | ICD-10-CM

## 2025-03-14 DIAGNOSIS — Z12.31 SCREENING MAMMOGRAM, ENCOUNTER FOR: ICD-10-CM

## 2025-03-14 RX ORDER — PROGESTERONE 100 MG/1
100 CAPSULE ORAL NIGHTLY
COMMUNITY

## 2025-03-14 RX ORDER — ESTRADIOL 0.03 MG/D
1 FILM, EXTENDED RELEASE TRANSDERMAL
COMMUNITY

## 2025-03-24 PROBLEM — N60.82 FLAT EPITHELIAL ATYPIA (FEA) OF LEFT BREAST: Status: ACTIVE | Noted: 2025-03-24

## 2025-03-24 NOTE — PROGRESS NOTES
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    Stage: N/A    Disease Status:  Surgical treatment complete, chemoprevention declined and high risk surveillance going.    History:   This 55 year old woman presented with imaging detected right breast calcifications.  The patient denies any palpable masses, nipple discharge, skin changes or axillary symptoms.  She underwent a 3D screening mammogram in 2018 and was recalled for calcifications in the right breast for which a six-month surveillance was recommended.  On August 15, 2018 her surveillance right breast imaging demonstrated heterogeneously dense breast tissue with an increased grouping of the calcifications at the 10 o'clock position of the right breast for which a stereotactic breast biopsy was recommended.  The biopsy took place on September 10, 2018 and her pathology demonstrated flat epithelial atypia associated with her calcifications.  She has no personal prior history of breast disease.  She has no known family history of breast cancer.  She denies any new concerns since her last visit in .  She had her screening mammogram in 2024 that showed a new retroareolar density for which additional imaging was recommended.  This took place on 2024 and had no sonographic correlate so an MRI was recommended.  This took place on 2024 and she was found to have no abnormal enhancement.  She does report that she has been started on an estradiol patch for the adherent dry skin and decreased libido.  She is here today for evaluation and recommendations for further therapy.        Past Medical History:    Visual impairment    glasses       Past Surgical History:   Procedure Laterality Date      2000    Tenzin biopsy stereo nodule 1 site right (cpt=19081) Right 2018    benign     Tenzin localization wire 1 site right  (cpt=19281)  10/2018    FEA    Tonsillectomy         Gynecological History:  Pt is a   Pt was 30 years old at time of first pregnancy.    She denies any cumulative breastfeeding history.  She achieved menarche at age 10 and LMP     She has history of oral contraceptive use for 9 years, currently using.  She denies infertility treatment to achieve pregnancy.    Medications:     progesterone 100 MG Oral Cap Take 1 capsule (100 mg total) by mouth nightly.      estradiol (LYLLANA) 0.025 MG/24HR Transdermal Patch Biweekly Place 1 patch onto the skin twice a week.      semaglutide 4 MG/3ML Subcutaneous Solution Pen-injector Inject 1 mg into the skin once a week.         Allergies:    Allergies   Allergen Reactions    Dander Runny nose     dog    Macrobid [Nitrofurantoin] RASH       Family History:   Family History   Problem Relation Age of Onset    Ovarian Cancer Paternal Grandmother 65        estimate, ? whether it was ovarian or bladder    Cancer Father 60        lung    Other (lung cancer) Father     Cancer Maternal Grandmother         throat    Colon Polyps Mother        She is not of Ashkenazi Jew ancestry.    Social History:  History   Alcohol Use    1.0 - 2.0 standard drink of alcohol/week    1 - 2 Standard drinks or equivalent per week     Comment: 1-2 drinks per week       History   Smoking Status    Former   Smokeless Tobacco    Never   The patient is .  She has 2 children.  She is employed part-time.    Review of Systems:  General:   The patient denies, fever, chills, night sweats, fatigue, generalized weakness, change in appetite or weight loss.    HEENT:     The patient denies eye irritation, cataracts, redness, glaucoma, yellowing of the eyes, change in vision or color blindness. The patient denies hearing loss, ringing in the ears, ear drainage, earaches, nasal congestion, nose bleeds, snoring, pain in mouth/throat, hoarseness, change in voice, facial trauma.    Respiratory:  The patient  denies chronic cough, phlegm, hemoptysis, pleurisy/chest pain, pneumonia, asthma, wheezing, difficulty in breathing with exertion, emphysema, chronic bronchitis, shortness of breath or abnormal sound when breathing.     Cardiovascular:  There is no history of chest pain, chest pressure/discomfort, palpitations, irregular heartbeat, fainting or near-fainting, difficulty breathing when lying flat, SOB/Coughing at night, swelling of the legs or chest pain while walking.    Breasts:  See history of present illness    Gastrointestinal:     There is no history of difficulty or pain with swallowing, reflux symptoms, vomiting, dark or bloody stools, constipation, yellowing of the skin, indigestion, nausea, change in bowel habits, diarrhea, abdominal pain or vomiting blood.     Genitourinary:  The patient denies frequent urination, needing to get up at night to urinate, urinary hesitancy or retaining urine, painful urination, urinary incontinence, decreased urine stream, blood in the urine or vaginal/penile discharge.    Skin:    The patient denies rash, itching, skin lesions, dry skin, change in skin color or change in moles.     Hematologic/Lymphatic:  The patient denies +easily bruising or bleeding or persistent swollen glands or lymph nodes.     Musculoskeletal:  The patient denies muscle aches/pain, joint pain, stiff joints, neck pain, back pain or bone pain.    Neuropsychiatric:  There is no history of migraines or severe headaches, seizure/epilepsy, speech problems, coordination problems, trembling/tremors, fainting/black outs, dizziness, memory problems, loss of sensation/numbness, problems walking, weakness, tingling or burning in hands/feet. There is no history of abusive relationship, bipolar disorder, sleep disturbance, anxiety, depression or feeling of despair.    Endocrine:    There is no history of poor/slow wound healing, weight loss/gain, fertility or hormone problems, cold intolerance, thyroid disease.      Allergic/Immunologic:  There is no history of hives, hay fever, angioedema or anaphylaxis.    /85 (BP Location: Left arm, Patient Position: Sitting, Cuff Size: adult)   Pulse 77   Temp 96.8 °F (36 °C) (Temporal)   Resp 16   Wt 67.6 kg (149 lb)   SpO2 100%   BMI 22.33 kg/m²     Physical Examination:  This is a well-nourished, alert and oriented woman. There is not palpable cervical, supraclavicular or axillary adenopathy.  The neck is supple with a midline trachea and no thyromegaly. Range of motion is good at both shoulders. Breasts are symmetrical. The tumorectomy site is in the right breast and shows no evidence of local recurrence. Both nipples are everted with no discharge. There is not dominant masses, focal nodularity or skin changes on either side. The abdomen is soft, flat and nontender, with no palpable masses or organomegaly.    Impression:   Ms. Evi Alford is a 55 year old woman presents with imaging detected right breast flat epithelial atypia and dense breast tissue status post right lumpectomy.    Discussion and Plan:  I had a discussion with the Patient regarding her breast exam. On exam today I found her to be healing well since surgery with no signs of new or recurrent disease.  I reviewed the prior imaging which is concordant with the exam.  I personally reviewed her pathology which showed no residual atypia for which no further surgery will be needed.      The patient's current five-year risk for breast cancer is elevated when compared to her peer group. We discussed factors that would further increase her risk for breast cancer over time to include age, future breast biopsy, as well as additional family members that may be diagnosed with breast cancer.      We then talked about surveillance and I recommended semiannual breast exams as well as continuing with annual mammography. With regard to risk-reducing interventions, we discussed lifestyle modifications including  attention to diet, exercise, weight control, moderation in alcohol use, and avoidance of long-term hormone replacement therapy in the future.  We also discussed the benefit of a cumulative time of eighteen months or more of breastfeeding.    We then touched upon the utility of chemoprevention. I reviewed that the FDA approved the use of Tamoxifen for breast cancer risk reduction in premenopausal women at increased risk for breast cancer based upon the results of the NSABP Breast Cancer Prevention Trial in 1998. The criteria for inclusion included a 5 year risk of breast cancer greater than 1.67% based on the Laurie Model which in order to establish a favorable risk versus benefit profile. We reviewed Evi Alford's risk, though she is a candidate tamoxifen for chemoprophylaxis at this time, we will defer given the small volume of atypia and concern of side effects at this time.  She is however started on estradiol hormone replacement therapy and we did discuss that we may need to be cautious with this if she develops any recurring atypia in the future.  She is due for her bilateral screening mammogram in November 2025 will likely plan for an MRI to be repeated in 1 year.    The patient was given ample opportunity for questions, and those questions were answered to her satisfaction.She was encouraged to contact the office with any questions or concerns prior to her next scheduled appointment.     This encounter lasted a total of 25 minutes, more than 50% of which was dedicated to the discussion of management options.

## 2025-03-25 ENCOUNTER — TELEPHONE (OUTPATIENT)
Dept: FAMILY MEDICINE CLINIC | Facility: CLINIC | Age: 55
End: 2025-03-25

## 2025-03-25 NOTE — TELEPHONE ENCOUNTER
Pt is calling because she was charged for a 30 min video and and it should have been a 15 min med check

## 2025-03-28 NOTE — TELEPHONE ENCOUNTER
Billing code per guidelines as visit covered management of 2 stable chronic conditions- anxiety and insomnia. That gives the higher billing code, not necessarily time based but management based.

## 2025-04-01 NOTE — TELEPHONE ENCOUNTER
Is the front office supposed to call and let the patient know this.  We do not handle billing codes or chronic condition questions please advise.

## 2025-05-22 DIAGNOSIS — G47.00 INSOMNIA, UNSPECIFIED TYPE: ICD-10-CM

## 2025-05-22 RX ORDER — ZOLPIDEM TARTRATE 10 MG/1
10 TABLET ORAL NIGHTLY PRN
Qty: 30 TABLET | Refills: 0 | OUTPATIENT
Start: 2025-05-22

## 2025-05-22 NOTE — TELEPHONE ENCOUNTER
Requested Prescriptions     Pending Prescriptions Disp Refills    zolpidem 10 MG Oral Tab 30 tablet 2     Sig: Take 1 tablet (10 mg total) by mouth nightly as needed for Sleep.     LOV 12/18/23    Patient was asked to follow-up in: Follow-up not documented in note    Appointment scheduled: 6/24/2025 Natan Gatica NP     Medication failed protocol.    Routed to Natan Gatica NP     Patient has a upcoming tele-visit soon

## 2025-05-23 RX ORDER — ZOLPIDEM TARTRATE 10 MG/1
10 TABLET ORAL NIGHTLY PRN
Qty: 30 TABLET | Refills: 2 | Status: SHIPPED | OUTPATIENT
Start: 2025-05-23

## 2025-05-23 NOTE — TELEPHONE ENCOUNTER
Duplicate request. First request under review w/provider    Requested Prescriptions     Refused Prescriptions Disp Refills    ZOLPIDEM 10 MG Oral Tab [Pharmacy Med Name: ZOLPIDEM 10MG TABLETS] 30 tablet 0     Sig: TAKE 1 TABLET(10 MG) BY MOUTH EVERY NIGHT AS NEEDED FOR SLEEP     Refused By: ARIANNE SEGURA     Reason for Refusal: Duplicate refill request

## 2025-05-23 NOTE — TELEPHONE ENCOUNTER
Pt comment:   Ed Gama,     Just scheduled a video call with you next month to discuss zolpidem.     Also just requested a refill of this medication from you, even though I have 7 pills left yet (thought things might get delayed due to holiday weekend).     Have a great holiday weekend!     Thank you,     Evi

## 2025-07-09 ENCOUNTER — PATIENT OUTREACH (OUTPATIENT)
Dept: FAMILY MEDICINE CLINIC | Facility: CLINIC | Age: 55
End: 2025-07-09

## (undated) DIAGNOSIS — N94.3 PREMENSTRUAL TENSION: Primary | ICD-10-CM

## (undated) DIAGNOSIS — G47.9 DIFFICULTY SLEEPING: ICD-10-CM

## (undated) DIAGNOSIS — F41.9 ANXIETY: ICD-10-CM

## (undated) DEVICE — SUTURE SILK 2-0 685H

## (undated) DEVICE — GAUZE SPONGES,12 PLY: Brand: CURITY

## (undated) DEVICE — FLEXIBLE YANKAUER,MEDIUM TIP, NO VACUUM CONTROL: Brand: ARGYLE

## (undated) DEVICE — DRAPE PACK CHEST & U BAR

## (undated) DEVICE — SUTURE MONOCRYL 4-0 PS-2

## (undated) DEVICE — Device: Brand: JELCO

## (undated) DEVICE — Device

## (undated) DEVICE — LAWSON - CATH QUAD JSN CRV 6F

## (undated) DEVICE — ADHESIVE MASTISOL 2/3CC VL

## (undated) DEVICE — SUTURE VICRYL 3-0 SH

## (undated) DEVICE — STERILE LATEX POWDER-FREE SURGICAL GLOVESWITH NITRILE COATING: Brand: PROTEXIS

## (undated) DEVICE — DRAPE TAPE: Brand: CONVERTORS

## (undated) DEVICE — LAWSON - CATH DECA XT 6X2 5 2X110 LG

## (undated) DEVICE — 3M™ STERI-STRIP™ REINFORCED ADHESIVE SKIN CLOSURES, R1547, 1/2 IN X 4 IN (12 MM X 100 MM), 6 STRIPS/ENVELOPE: Brand: 3M™ STERI-STRIP™

## (undated) DEVICE — CAUTERY BLADE 2IN INS E1455

## (undated) DEVICE — CLIP MED INTNL HMCLP TNTLM

## (undated) DEVICE — SOL  .9 1000ML BTL

## (undated) DEVICE — CLIP SM INTNL HMCLP TNTLM ESCP

## (undated) DEVICE — 12 ML SYRINGE LUER-LOCK TIP: Brand: MONOJECT

## (undated) DEVICE — MINOR GENERAL: Brand: MEDLINE INDUSTRIES, INC.

## (undated) DEVICE — VEST SRG 3 MED CUP R/L

## (undated) NOTE — Clinical Note
Mart Lloyd - I saw Sina Mccurdy today with UTIs. I've recommended vag estrogen and abx with UTI trigger. I will work to manage her sx. I appreciate the opportunity to participate in her care.  Thanks, Jerry

## (undated) NOTE — LETTER
[address], [place], [place]     AUTHORIZATION FOR SURGICAL OPERATION OR PROCEDURE    I hereby authorize                                                      my Physician(s) and whomever may be designated as the doctor's A 4. I consent to the photographing of procedure(s) to be performed for the purposes of advancing medicine, science and/or education, provided my identity is not revealed.  If the procedure has been videotaped, the physician/surgeon will obtain the original v (Witness signature)                                                                                                  (Date)                                (Time)  STATEMENT OF PHYSICIAN My signature below affirms that prior to the time of the procedure;  I

## (undated) NOTE — ED AVS SNAPSHOT
Linda Pressley   MRN: [id number]    Department:  [facility] Emergency Department in [place]   Date of Visit:  9/12/2017           Disclosure     Insurance plans vary and the physician(s) referred by the ER may not be covered by your plan.  Please contac If you have been prescribed any medication(s), please fill your prescription right away and begin taking the medication(s) as directed    If the emergency physician has read X-rays, these will be re-interpreted by a radiologist.  If there is a significant

## (undated) NOTE — LETTER
[address] [place] [address] Rd, [place], [place]     AUTHORIZATION FOR SURGICAL OPERATION OR PROCEDURE    I hereby authorize Dr. Sherill Peabody, MD, my Physician(s) and whomever may be designated as the doctor's Assistant, to perform the f 4. I consent to the photographing of procedure(s) to be performed for the purposes of advancing medicine, science and/or education, provided my identity is not revealed.  If the procedure has been videotaped, the physician/surgeon will obtain the original v (Witness signature)                                                                                                  (Date)                                (Time)  STATEMENT OF PHYSICIAN My signature below affirms that prior to the time of the procedure;  I

## (undated) NOTE — ED AVS SNAPSHOT
Rema Samuels   MRN: [id number]    Department:  [facility] Emergency Department in [place]   Date of Visit:  12/3/2019           Disclosure     Insurance plans vary and the physician(s) referred by the ER may not be covered by your plan.  Please contac tell this physician (or your personal doctor if your instructions are to return to your personal doctor) about any new or lasting problems. The primary care or specialist physician will see patients referred from the [facility] Emergency Department.  Real Schwartz

## (undated) NOTE — LETTER
Darcy Martin [address]  [facility] [address], [place], [place]  [address]  INFORMED CONSENT FOR TRANSFUSION OF BLOOD OR BLOOD PRODUCTS  My physician has informed me of the nature, purpose, benefits and risks of transfusion for blood and blood components that ______________________________________________  (Signature of Patient)                                                            (Responsible party in case of Minor,

## (undated) NOTE — LETTER
[facility]  Administration of Anesthesia  1. Sangeeta Sher, or _________________________________ acting on her behalf, (Patient) (Dependent/Representative) request to receive anesthesia for my pending procedure/operation/treatment.   A infections, high spinal block, spinal bleeding, seizure, cardiac arrest and death. 7. AWARENESS: I understand that it is possible (but unlikely) to have explicit memory of events from the operating room while under general anesthesia.   8. ELECTROCONVULSIV unconscious pt /Relationship    My signature below affirms that prior to the time of the procedure, I have explained to the patient and/or his/her guardian, the risks and benefits of undergoing anesthesia, as well as any reasonable alternatives.     _______

## (undated) NOTE — ED AVS SNAPSHOT
Raulhailylaura Toby   MRN: [id number]    Department:  [facility] in [facility]   Date of Visit:  11/29/2019           Disclosure     Insurance plans vary and the physician(s) referred by the ER may not be covered by your plan.  Please conta tell this physician (or your personal doctor if your instructions are to return to your personal doctor) about any new or lasting problems. The primary care or specialist physician will see patients referred from the [facility] Emergency Department.  Anders Mancilla